# Patient Record
Sex: FEMALE | Race: WHITE | NOT HISPANIC OR LATINO | URBAN - METROPOLITAN AREA
[De-identification: names, ages, dates, MRNs, and addresses within clinical notes are randomized per-mention and may not be internally consistent; named-entity substitution may affect disease eponyms.]

---

## 2017-04-04 ENCOUNTER — OUTPATIENT (OUTPATIENT)
Dept: OUTPATIENT SERVICES | Facility: HOSPITAL | Age: 82
LOS: 1 days | Discharge: HOME | End: 2017-04-04

## 2017-06-27 DIAGNOSIS — Z79.01 LONG TERM (CURRENT) USE OF ANTICOAGULANTS: ICD-10-CM

## 2017-06-27 DIAGNOSIS — I48.91 UNSPECIFIED ATRIAL FIBRILLATION: ICD-10-CM

## 2017-11-07 ENCOUNTER — OUTPATIENT (OUTPATIENT)
Dept: OUTPATIENT SERVICES | Facility: HOSPITAL | Age: 82
LOS: 1 days | Discharge: HOME | End: 2017-11-07

## 2017-11-07 DIAGNOSIS — E03.9 HYPOTHYROIDISM, UNSPECIFIED: ICD-10-CM

## 2017-11-07 DIAGNOSIS — I25.10 ATHEROSCLEROTIC HEART DISEASE OF NATIVE CORONARY ARTERY WITHOUT ANGINA PECTORIS: ICD-10-CM

## 2017-11-07 DIAGNOSIS — E78.5 HYPERLIPIDEMIA, UNSPECIFIED: ICD-10-CM

## 2017-11-07 DIAGNOSIS — E78.4 OTHER HYPERLIPIDEMIA: ICD-10-CM

## 2017-11-07 DIAGNOSIS — Z02.9 ENCOUNTER FOR ADMINISTRATIVE EXAMINATIONS, UNSPECIFIED: ICD-10-CM

## 2017-11-07 DIAGNOSIS — E55.9 VITAMIN D DEFICIENCY, UNSPECIFIED: ICD-10-CM

## 2017-11-07 DIAGNOSIS — E11.9 TYPE 2 DIABETES MELLITUS WITHOUT COMPLICATIONS: ICD-10-CM

## 2017-11-07 DIAGNOSIS — D64.9 ANEMIA, UNSPECIFIED: ICD-10-CM

## 2017-11-07 DIAGNOSIS — E13.9 OTHER SPECIFIED DIABETES MELLITUS WITHOUT COMPLICATIONS: ICD-10-CM

## 2017-11-07 DIAGNOSIS — D64.0 HEREDITARY SIDEROBLASTIC ANEMIA: ICD-10-CM

## 2017-11-07 DIAGNOSIS — I10 ESSENTIAL (PRIMARY) HYPERTENSION: ICD-10-CM

## 2017-11-07 DIAGNOSIS — N39.0 URINARY TRACT INFECTION, SITE NOT SPECIFIED: ICD-10-CM

## 2017-11-09 ENCOUNTER — OUTPATIENT (OUTPATIENT)
Dept: OUTPATIENT SERVICES | Facility: HOSPITAL | Age: 82
LOS: 1 days | Discharge: HOME | End: 2017-11-09

## 2017-11-09 DIAGNOSIS — E78.5 HYPERLIPIDEMIA, UNSPECIFIED: ICD-10-CM

## 2017-11-09 DIAGNOSIS — D64.0 HEREDITARY SIDEROBLASTIC ANEMIA: ICD-10-CM

## 2017-11-09 DIAGNOSIS — I25.10 ATHEROSCLEROTIC HEART DISEASE OF NATIVE CORONARY ARTERY WITHOUT ANGINA PECTORIS: ICD-10-CM

## 2017-11-09 DIAGNOSIS — I10 ESSENTIAL (PRIMARY) HYPERTENSION: ICD-10-CM

## 2017-11-09 DIAGNOSIS — E11.9 TYPE 2 DIABETES MELLITUS WITHOUT COMPLICATIONS: ICD-10-CM

## 2017-11-09 DIAGNOSIS — D64.9 ANEMIA, UNSPECIFIED: ICD-10-CM

## 2017-11-09 DIAGNOSIS — E55.9 VITAMIN D DEFICIENCY, UNSPECIFIED: ICD-10-CM

## 2017-11-09 DIAGNOSIS — E03.9 HYPOTHYROIDISM, UNSPECIFIED: ICD-10-CM

## 2017-11-09 DIAGNOSIS — E13.9 OTHER SPECIFIED DIABETES MELLITUS WITHOUT COMPLICATIONS: ICD-10-CM

## 2017-11-09 DIAGNOSIS — E78.4 OTHER HYPERLIPIDEMIA: ICD-10-CM

## 2017-11-09 DIAGNOSIS — N39.0 URINARY TRACT INFECTION, SITE NOT SPECIFIED: ICD-10-CM

## 2018-10-04 ENCOUNTER — OUTPATIENT (OUTPATIENT)
Dept: OUTPATIENT SERVICES | Facility: HOSPITAL | Age: 83
LOS: 1 days | Discharge: HOME | End: 2018-10-04

## 2018-10-04 DIAGNOSIS — I25.10 ATHEROSCLEROTIC HEART DISEASE OF NATIVE CORONARY ARTERY WITHOUT ANGINA PECTORIS: ICD-10-CM

## 2018-10-04 DIAGNOSIS — R94.6 ABNORMAL RESULTS OF THYROID FUNCTION STUDIES: ICD-10-CM

## 2020-01-12 ENCOUNTER — INPATIENT (INPATIENT)
Facility: HOSPITAL | Age: 85
LOS: 9 days | End: 2020-01-22
Attending: HOSPITALIST | Admitting: HOSPITALIST
Payer: MEDICARE

## 2020-01-12 VITALS
DIASTOLIC BLOOD PRESSURE: 114 MMHG | HEART RATE: 127 BPM | OXYGEN SATURATION: 100 % | RESPIRATION RATE: 25 BRPM | WEIGHT: 154.32 LBS | SYSTOLIC BLOOD PRESSURE: 173 MMHG

## 2020-01-12 DIAGNOSIS — T85.79XA INFECTION AND INFLAMMATORY REACTION DUE TO OTHER INTERNAL PROSTHETIC DEVICES, IMPLANTS AND GRAFTS, INITIAL ENCOUNTER: Chronic | ICD-10-CM

## 2020-01-12 LAB
ANION GAP SERPL CALC-SCNC: 16 MMOL/L — HIGH (ref 7–14)
APPEARANCE UR: CLEAR — SIGNIFICANT CHANGE UP
BACTERIA # UR AUTO: NEGATIVE — SIGNIFICANT CHANGE UP
BASE EXCESS BLDV CALC-SCNC: -5.7 MMOL/L — LOW (ref -2–2)
BASE EXCESS BLDV CALC-SCNC: -6.4 MMOL/L — LOW (ref -2–2)
BASOPHILS # BLD AUTO: 0.01 K/UL — SIGNIFICANT CHANGE UP (ref 0–0.2)
BASOPHILS NFR BLD AUTO: 0.1 % — SIGNIFICANT CHANGE UP (ref 0–1)
BILIRUB UR-MCNC: NEGATIVE — SIGNIFICANT CHANGE UP
BUN SERPL-MCNC: 70 MG/DL — CRITICAL HIGH (ref 10–20)
CA-I SERPL-SCNC: 1.13 MMOL/L — SIGNIFICANT CHANGE UP (ref 1.12–1.3)
CA-I SERPL-SCNC: 1.15 MMOL/L — SIGNIFICANT CHANGE UP (ref 1.12–1.3)
CALCIUM SERPL-MCNC: 8.4 MG/DL — LOW (ref 8.5–10.1)
CHLORIDE SERPL-SCNC: 111 MMOL/L — HIGH (ref 98–110)
CO2 SERPL-SCNC: 15 MMOL/L — LOW (ref 17–32)
COLOR SPEC: YELLOW — SIGNIFICANT CHANGE UP
CREAT SERPL-MCNC: 1.6 MG/DL — HIGH (ref 0.7–1.5)
DIFF PNL FLD: ABNORMAL
EOSINOPHIL # BLD AUTO: 0 K/UL — SIGNIFICANT CHANGE UP (ref 0–0.7)
EOSINOPHIL NFR BLD AUTO: 0 % — SIGNIFICANT CHANGE UP (ref 0–8)
EPI CELLS # UR: 2 /HPF — SIGNIFICANT CHANGE UP (ref 0–5)
GAS PNL BLDV: 136 MMOL/L — SIGNIFICANT CHANGE UP (ref 136–145)
GAS PNL BLDV: 141 MMOL/L — SIGNIFICANT CHANGE UP (ref 136–145)
GAS PNL BLDV: SIGNIFICANT CHANGE UP
GAS PNL BLDV: SIGNIFICANT CHANGE UP
GLUCOSE SERPL-MCNC: 129 MG/DL — HIGH (ref 70–99)
GLUCOSE UR QL: NEGATIVE — SIGNIFICANT CHANGE UP
HCO3 BLDV-SCNC: 17 MMOL/L — LOW (ref 22–29)
HCO3 BLDV-SCNC: 19 MMOL/L — LOW (ref 22–29)
HCT VFR BLD CALC: 40.7 % — SIGNIFICANT CHANGE UP (ref 37–47)
HCT VFR BLDA CALC: 37.7 % — SIGNIFICANT CHANGE UP (ref 34–44)
HCT VFR BLDA CALC: 39.6 % — SIGNIFICANT CHANGE UP (ref 34–44)
HGB BLD CALC-MCNC: 12.3 G/DL — LOW (ref 14–18)
HGB BLD CALC-MCNC: 12.9 G/DL — LOW (ref 14–18)
HGB BLD-MCNC: 13.3 G/DL — SIGNIFICANT CHANGE UP (ref 12–16)
HYALINE CASTS # UR AUTO: 6 /LPF — SIGNIFICANT CHANGE UP (ref 0–7)
IMM GRANULOCYTES NFR BLD AUTO: 0.8 % — HIGH (ref 0.1–0.3)
KETONES UR-MCNC: NEGATIVE — SIGNIFICANT CHANGE UP
LACTATE BLDV-MCNC: 3.5 MMOL/L — HIGH (ref 0.5–1.6)
LACTATE BLDV-MCNC: 3.9 MMOL/L — HIGH (ref 0.5–1.6)
LACTATE SERPL-SCNC: 3.7 MMOL/L — HIGH (ref 0.7–2)
LEUKOCYTE ESTERASE UR-ACNC: NEGATIVE — SIGNIFICANT CHANGE UP
LIDOCAIN IGE QN: 99 U/L — HIGH (ref 7–60)
LYMPHOCYTES # BLD AUTO: 1.18 K/UL — LOW (ref 1.2–3.4)
LYMPHOCYTES # BLD AUTO: 10.1 % — LOW (ref 20.5–51.1)
MAGNESIUM SERPL-MCNC: 2.1 MG/DL — SIGNIFICANT CHANGE UP (ref 1.8–2.4)
MCHC RBC-ENTMCNC: 31.4 PG — HIGH (ref 27–31)
MCHC RBC-ENTMCNC: 32.7 G/DL — SIGNIFICANT CHANGE UP (ref 32–37)
MCV RBC AUTO: 96.2 FL — SIGNIFICANT CHANGE UP (ref 81–99)
MONOCYTES # BLD AUTO: 0.6 K/UL — SIGNIFICANT CHANGE UP (ref 0.1–0.6)
MONOCYTES NFR BLD AUTO: 5.1 % — SIGNIFICANT CHANGE UP (ref 1.7–9.3)
NEUTROPHILS # BLD AUTO: 9.78 K/UL — HIGH (ref 1.4–6.5)
NEUTROPHILS NFR BLD AUTO: 83.9 % — HIGH (ref 42.2–75.2)
NITRITE UR-MCNC: NEGATIVE — SIGNIFICANT CHANGE UP
NRBC # BLD: 2 /100 WBCS — HIGH (ref 0–0)
PCO2 BLDV: 26 MMHG — LOW (ref 41–51)
PCO2 BLDV: 35 MMHG — LOW (ref 41–51)
PH BLDV: 7.35 — SIGNIFICANT CHANGE UP (ref 7.26–7.43)
PH BLDV: 7.42 — SIGNIFICANT CHANGE UP (ref 7.26–7.43)
PH UR: 5.5 — SIGNIFICANT CHANGE UP (ref 5–8)
PLATELET # BLD AUTO: 80 K/UL — LOW (ref 130–400)
PO2 BLDV: 164 MMHG — HIGH (ref 20–40)
PO2 BLDV: 183 MMHG — HIGH (ref 20–40)
POTASSIUM BLDV-SCNC: 4.3 MMOL/L — SIGNIFICANT CHANGE UP (ref 3.3–5.6)
POTASSIUM BLDV-SCNC: 5.4 MMOL/L — SIGNIFICANT CHANGE UP (ref 3.3–5.6)
POTASSIUM SERPL-MCNC: 4.8 MMOL/L — SIGNIFICANT CHANGE UP (ref 3.5–5)
POTASSIUM SERPL-SCNC: 4.8 MMOL/L — SIGNIFICANT CHANGE UP (ref 3.5–5)
PROT UR-MCNC: ABNORMAL
RBC # BLD: 4.23 M/UL — SIGNIFICANT CHANGE UP (ref 4.2–5.4)
RBC # FLD: 17.9 % — HIGH (ref 11.5–14.5)
RBC CASTS # UR COMP ASSIST: 2 /HPF — SIGNIFICANT CHANGE UP (ref 0–4)
SAO2 % BLDV: 100 % — SIGNIFICANT CHANGE UP
SAO2 % BLDV: 100 % — SIGNIFICANT CHANGE UP
SODIUM SERPL-SCNC: 142 MMOL/L — SIGNIFICANT CHANGE UP (ref 135–146)
SP GR SPEC: 1.02 — SIGNIFICANT CHANGE UP (ref 1.01–1.02)
UROBILINOGEN FLD QL: SIGNIFICANT CHANGE UP
WBC # BLD: 11.66 K/UL — HIGH (ref 4.8–10.8)
WBC # FLD AUTO: 11.66 K/UL — HIGH (ref 4.8–10.8)
WBC UR QL: 1 /HPF — SIGNIFICANT CHANGE UP (ref 0–5)

## 2020-01-12 PROCEDURE — 99291 CRITICAL CARE FIRST HOUR: CPT | Mod: GC

## 2020-01-12 PROCEDURE — 71045 X-RAY EXAM CHEST 1 VIEW: CPT | Mod: 26

## 2020-01-12 PROCEDURE — 74177 CT ABD & PELVIS W/CONTRAST: CPT | Mod: 26

## 2020-01-12 RX ORDER — FUROSEMIDE 40 MG
1 TABLET ORAL
Qty: 0 | Refills: 0 | DISCHARGE

## 2020-01-12 RX ORDER — AZTREONAM 2 G
0 VIAL (EA) INJECTION
Qty: 0 | Refills: 0 | DISCHARGE

## 2020-01-12 RX ORDER — METOPROLOL TARTRATE 50 MG
25 TABLET ORAL DAILY
Refills: 0 | Status: DISCONTINUED | OUTPATIENT
Start: 2020-01-12 | End: 2020-01-22

## 2020-01-12 RX ORDER — CHLORHEXIDINE GLUCONATE 213 G/1000ML
1 SOLUTION TOPICAL
Refills: 0 | Status: DISCONTINUED | OUTPATIENT
Start: 2020-01-12 | End: 2020-01-22

## 2020-01-12 RX ORDER — AZTREONAM 2 G
500 VIAL (EA) INJECTION ONCE
Refills: 0 | Status: COMPLETED | OUTPATIENT
Start: 2020-01-12 | End: 2020-01-12

## 2020-01-12 RX ORDER — APIXABAN 2.5 MG/1
1 TABLET, FILM COATED ORAL
Qty: 0 | Refills: 0 | DISCHARGE

## 2020-01-12 RX ORDER — LINEZOLID 600 MG/300ML
0 INJECTION, SOLUTION INTRAVENOUS
Qty: 0 | Refills: 0 | DISCHARGE

## 2020-01-12 RX ORDER — METRONIDAZOLE 500 MG
500 TABLET ORAL ONCE
Refills: 0 | Status: COMPLETED | OUTPATIENT
Start: 2020-01-12 | End: 2020-01-12

## 2020-01-12 RX ORDER — SODIUM CHLORIDE 9 MG/ML
2500 INJECTION, SOLUTION INTRAVENOUS ONCE
Refills: 0 | Status: COMPLETED | OUTPATIENT
Start: 2020-01-12 | End: 2020-01-12

## 2020-01-12 RX ORDER — HEPARIN SODIUM 5000 [USP'U]/ML
10 INJECTION INTRAVENOUS; SUBCUTANEOUS
Qty: 25000 | Refills: 0 | Status: DISCONTINUED | OUTPATIENT
Start: 2020-01-12 | End: 2020-01-14

## 2020-01-12 RX ORDER — LINEZOLID 600 MG/300ML
600 INJECTION, SOLUTION INTRAVENOUS EVERY 12 HOURS
Refills: 0 | Status: DISCONTINUED | OUTPATIENT
Start: 2020-01-12 | End: 2020-01-13

## 2020-01-12 RX ORDER — MEROPENEM 1 G/30ML
1000 INJECTION INTRAVENOUS EVERY 12 HOURS
Refills: 0 | Status: COMPLETED | OUTPATIENT
Start: 2020-01-12 | End: 2020-01-17

## 2020-01-12 RX ORDER — METRONIDAZOLE 500 MG
1 TABLET ORAL
Qty: 0 | Refills: 0 | DISCHARGE

## 2020-01-12 RX ORDER — LINEZOLID 600 MG/300ML
600 INJECTION, SOLUTION INTRAVENOUS ONCE
Refills: 0 | Status: COMPLETED | OUTPATIENT
Start: 2020-01-12 | End: 2020-01-12

## 2020-01-12 RX ADMIN — Medication 50 MILLIGRAM(S): at 12:30

## 2020-01-12 RX ADMIN — LINEZOLID 300 MILLIGRAM(S): 600 INJECTION, SOLUTION INTRAVENOUS at 13:15

## 2020-01-12 RX ADMIN — Medication 500 MILLIGRAM(S): at 13:30

## 2020-01-12 RX ADMIN — SODIUM CHLORIDE 2500 MILLILITER(S): 9 INJECTION, SOLUTION INTRAVENOUS at 12:07

## 2020-01-12 RX ADMIN — Medication 100 MILLIGRAM(S): at 15:23

## 2020-01-12 RX ADMIN — HEPARIN SODIUM 0.1 UNIT(S)/HR: 5000 INJECTION INTRAVENOUS; SUBCUTANEOUS at 23:36

## 2020-01-12 NOTE — ED ADULT NURSE NOTE - CHIEF COMPLAINT QUOTE
Pt sent in from [facility] for respiratory distress, use of accessory muscles, and hypoxia. Pt was 75% on 3l nc, 100% on non breather.

## 2020-01-12 NOTE — ED PROVIDER NOTE - CARE PLAN
Principal Discharge DX:	Sepsis  Secondary Diagnosis:	RITU (acute kidney injury)  Secondary Diagnosis:	Fever Principal Discharge DX:	Sepsis  Secondary Diagnosis:	RITU (acute kidney injury)  Secondary Diagnosis:	Fever  Secondary Diagnosis:	Pneumonia

## 2020-01-12 NOTE — H&P ADULT - NSHPPHYSICALEXAM_GEN_ALL_CORE
GENERAL: NAD, lethargic  HEAD:  Atraumatic, Normocephalic  EYES: EOMI, PERRLA, conjunctiva and sclera clear  ENT: Moist mucous membranes  CHEST/LUNG: b/l basal crackles   HEART: normal s1 s2   ABDOMEN: Soft, Nontender, Nondistended. No hepatomegally, cholecystostomy tube in place  EXTREMITIES:  brisk capillary refill. No clubbing, cyanosis, or edema  NERVOUS SYSTEM:  Alert & Oriented X2,   SKIN: No rashes or lesions GENERAL: NAD, lethargic  HEAD:  Atraumatic, Normocephalic  EYES: EOMI, PERRLA, conjunctiva and sclera clear  ENT: Moist mucous membranes  CHEST/LUNG: b/l basal crackles   HEART: normal s1 s2   ABDOMEN: Soft, Nontender, Nondistended. No hepatomegaly, cholecystostomy tube in place  EXTREMITIES:  brisk capillary refill. No clubbing, cyanosis, or edema  NERVOUS SYSTEM:  Alert & Oriented X2,   SKIN: No rashes or lesions

## 2020-01-12 NOTE — H&P ADULT - ASSESSMENT
89 year old with pmhx of Afib on Eliquis, diverticulosis, GERD, HFrEF, chronic Smith right eye blindness, presents from NH with respiratory distress for 2 days duration. admitted for Acute hypoxic resp failure.     # Acute hypoxic respiratory failure, secondary to HCAP and possible Heart failure exacerbation.   # Sepsis   # B/I plural effusion, and right heart enlargement in CT scan is another component, crackles on P/E   # HAGMA secondary to sepsis and Uremia   # Type 2 NSTEMI, no chest pain or   - fever and hypotension. lactic acidosis   - Meropenem and vancomycin   - MRSA swab if negative d/c vancomycin   - blood culture, sputum culture   - c/w Oxygen support, currently on 2 L NC   - urine legionella.   - RVP   - LE Duplex   - 2d echo   - BNP   - ID eval    # afib on 2.5 mg Eliquis   - hold Eliquis and switch to heparin drip   - f/u PTT   - c/w metoprolol 25 mg QD     # EFrEF  - admit to telemetry  - pulmonary edema and plural effusion are another factors explain the symptoms  - hold PO lasix for now   - strict intake output  - daily weight   - c/w BB     # Cholecystitis s/p cholecystostomy tube placed last week at [facility]    # DVT PPX on heparin   # DASH Diet   # FULL CODE 89 year old with pmhx of Afib on Eliquis, diverticulosis, GERD, HFrEF, chronic Smith right eye blindness, presents from NH with respiratory distress for 2 days duration. admitted for Acute hypoxic resp failure.     # Acute hypoxic respiratory failure, secondary to HCAP and possible Heart failure exacerbation.   # Sepsis   # HAGMA secondary to sepsis and Uremia   - fever and hypotension. lactic acidosis   - s/p 2.5 Ls, aztreonam, zyvox and flagyl in ED   - add Meropenem and c/w zyvox  - MRSA swab if negative d/c vancomycin   - blood culture, sputum culture   - c/w Oxygen support, currently on 2 L NC   - urine legionella. .  - RVP   - LE Duplex   - 2d echo   - BNP   - ID eval    # afib on 2.5 mg Eliquis   - hold Eliquis and switch to heparin drip   - f/u PTT   - c/w metoprolol 25 mg QD     # likely Type 2 NSTEMI due to hypotension, no chest pain or ischemic changes in the EKG  # B/I plural effusion, and right heart enlargement in CT scan is another component, crackles on P/E   # RITU vs worsening CKD  # H/O EFrEF  - hemodynamically stable now   - monitor electrolytes and UO  - urine electrolytes  - consider diuresis    - admit to telemetry  - pulmonary edema and plural effusion are another factors explain the symptoms  - hold PO lasix for now   - strict intake output  - daily weight   - c/w BB     # Cholecystitis s/p cholecystostomy tube placed last week at [facility]    # DVT PPX on heparin   # DASH Diet   # FULL CODE 89 year old with pmhx of Afib on Eliquis, diverticulosis, GERD, HFrEF, chronic Smith right eye blindness, presents from NH with respiratory distress for 2 days duration. admitted for Acute hypoxic resp failure.     # Acute hypoxic respiratory failure, secondary to HCAP and possible Heart failure exacerbation.   # Sepsis   # HAGMA secondary to sepsis and Uremia   - fever and hypotension. lactic acidosis   - s/p 2.5 Ls, aztreonam, zyvox and flagyl in ED   - add Meropenem and c/w zyvox  - MRSA swab if negative d/c vancomycin   - blood culture, sputum culture   - c/w Oxygen support, currently on 2 L NC   - urine legionella. .  - RVP   - LE Duplex   - 2d echo   - BNP   - ID eval    # afib on 2.5 mg Eliquis   - hold Eliquis and switch to heparin drip   - f/u PTT   - c/w metoprolol 25 mg QD     # likely Type 2 NSTEMI due to hypotension, no chest pain or ischemic changes in the EKG  # B/I plural effusion, and right heart enlargement in CT scan is another component, crackles on P/E   # RITU vs worsening CKD  # H/O EFrEF  - hemodynamically stable now   - monitor electrolytes and UO  - urine electrolytes  - consider diuresis    - admit to telemetry  - pulmonary edema and plural effusion are another factors explain the symptoms  - hold PO lasix for now   - strict intake output  - daily weight   - c/w BB     # Cholecystitis s/p cholecystostomy tube placed last week at [facility]  # soft tissue density in the presacral space in CT scan, possible rectal pathology, further work up as outpt  # DVT PPX on heparin   # DASH Diet   # FULL CODE

## 2020-01-12 NOTE — ED PROVIDER NOTE - ATTENDING CONTRIBUTION TO CARE
61 y/o F pmh afib on NOAC, hx cholecystitis s/p cholecystomy drain, hx PNA, started on aztreonam, metronidazole, linezolid, first dose was supposed to be last night (given?), sent from NH for desat and resp distress. + cough. As per family pt was more functional weeks ago, but has had steady decline since illness. Pt is DNR.

## 2020-01-12 NOTE — H&P ADULT - NSHPSOURCEINFORD_GEN_ALL_CORE
Child/Patient -Oral Nystatin 4 x daily for oral candidiasis  -Fluconazole 150mg x1 for Vaginal candidiasis

## 2020-01-12 NOTE — ED PROVIDER NOTE - OBJECTIVE STATEMENT
89y/o F afib, cholecystectomy with biliary drain , nicolas is sent from nursing home for respiratory distress pt sat 76 on room air, now on nonrebreather at 76%. cough, congestion, runny nose at nh.  no vomiting or diarrhea.

## 2020-01-12 NOTE — H&P ADULT - HISTORY OF PRESENT ILLNESS
89 year old with pmhx of Afib on Eliquis, diverticulosis, GERD, chronic Smith right eye blindness, presents from NH with respiratory distress for 2 days duration.   Patient was discharged to NH from [email or web address] after being treated for septic shock secondary to PNA, her previous hospitalization was complicated with cholecystitis managed with cholecystostomy tube, and RITU.   Per family she is lethargic and poorly communicate since she admitted to NH , has been having worsening SOB for the last 2 days, SOB is associated with cough, runny nose, denies chest pain, urinary symptoms, abd pain.   per NH nurse, patient ahd low appetite, poor PO intake, was not participating in rehab. was not complaining of any other symptoms.   was discharged to NH on Aztreonam, flagyl and zyvox. the last one was never given there due to delay in delivery.   per family; at baseline she has mild dementia and she was fully independent before recent illness last week.  in ed 127/114, hr 127, 100% o2 sat on 8 Ls, temp 101.7 lactate 3.8, CXR showed b/l lower lobe opacities.   discharged to NH with a Smith catheter that was renewed in ED.   had ct abd in ED that showed Marked right heart enlargement with reflux contrast into the IVC and hepatic veins, compatible with right heart failure. Cholecystostomy tube in gallbladder fossa. and increased soft tissue density in the presacral space etiology not apparent on this exam however rectal pathology is a consideration.

## 2020-01-12 NOTE — ED ADULT NURSE NOTE - OBJECTIVE STATEMENT
pt here presents from nh d/t hypoxia. pt is verbal to painful stimuli. febrile on arrival. maintaining sats on 3lnc. freedman present from NH and changed in ED

## 2020-01-12 NOTE — ED PROVIDER NOTE - CLINICAL SUMMARY MEDICAL DECISION MAKING FREE TEXT BOX
Pt w/ desat and sepsis. O2 improved. Pt given IVF, Broad spect abx. Pt looked more comfortable, skin better perfused, more awake and active. CXR concerning for b/l infiltrate. ct abd no acute new process. elevated lactate. Pt stable for entire ed course. Will admit to med for sepsis for further iv abx, ivf, specialist consult and further w/up.

## 2020-01-12 NOTE — ED ADULT TRIAGE NOTE - CHIEF COMPLAINT QUOTE
Pt sent in from [facility] for respiratory distress, use of accessory muscles, and hypoxia. Pt was 75% on 3l nc, 100% on non breather.

## 2020-01-12 NOTE — H&P ADULT - NSHPLABSRESULTS_GEN_ALL_CORE
13.3   11. )-----------( 80       ( 2020 11:40 )             40.7           142  |  111<H>  |  70<HH>  ----------------------------<  129<H>  4.8   |  15<L>  |  1.6<H>    Ca    8.4<L>      2020 11:40  Mg     2.1                     Urinalysis Basic - ( 2020 15:50 )    Color: Yellow / Appearance: Clear / S.023 / pH: x  Gluc: x / Ketone: Negative  / Bili: Negative / Urobili: <2 mg/dL   Blood: x / Protein: 30 mg/dL / Nitrite: Negative   Leuk Esterase: Negative / RBC: 2 /HPF / WBC 1 /HPF   Sq Epi: x / Non Sq Epi: 2 /HPF / Bacteria: Negative            Lactate Trend   @ 11:40 Lactate:3.7             CAPILLARY BLOOD GLUCOSE            < from: CT Abdomen and Pelvis w/ IV Cont (20 @ 17:08) >    Right greater than left pleural effusions.    Marked right heart enlargement with reflux contrast into the IVC and hepatic veins, compatible with right heart failure.    Cholecystostomy tube in gallbladder fossa.    There is increased soft tissue density in the presacral space etiology not apparent on this exam however rectal pathology is a consideration.    < end of copied text >

## 2020-01-12 NOTE — ED PROVIDER NOTE - INTERNATIONAL TRAVEL
Ongoing SW/CM Assessment/Plan of Care Note     See SW/CM flowsheets for goals and other objective data.    Patient/Family discharge goal (s):  Goal #1: Psychosocial needs assessed  Goal #2: Home discharge arranged       PT Recommendation:  Recommendation for Discharge: PT: ([facility] vs HH pending gait independence)    OT Recommendation:  Recommendations for Discharge: OT: Assisted living, Home, Home therapy(back to [facility] with/without home therapy )    SLP Recommendation:       Disposition:       Progress note:   Attended 3rd floor outcome facilitation team rounding. See interdisciplinary team note for clinical status.     Pt is able to ambulate at baseline so she can return to her assisted living facility upon d/c.  [facility] RN, Alison (#[phone]), was updated.  She requested that [facility] contact her when pt is d/c so she can assist in approving the services.      Son Devan Matthews may be able to transport pt back to her home tomorrow.  #[phone] or #[phone].  RN Alison will update son on possible discharge within the next few days.    SW to continue to follow through discharge; patient is aware.             No

## 2020-01-12 NOTE — H&P ADULT - ATTENDING COMMENTS
I have seen and evaluated and examined the patient today. I agree with the resident's documentation set forth.  Agree w empiric ABX to treat sepsis & checking echo to assess cardiac status.

## 2020-01-12 NOTE — ED PROVIDER NOTE - CRITICAL CARE PROVIDED
documentation/direct patient care (not related to procedure)/consult w/ pt's family directly relating to pts condition/interpretation of diagnostic studies/conducted a detailed discussion of DNR status

## 2020-01-12 NOTE — ED PROVIDER NOTE - PHYSICAL EXAMINATION
CONSTITUTIONAL: Ill appearing  SKIN: Warm dry  HEAD: NCAT  EYES: blindness (?)  ENT: dry MM  NECK: Supple; non tender.  CARD: RRR  RESP: CTAB  ABD: Soft non ttp; + biliary drain draining brown fluid  EXT: non tender  NEURO: moves all extrem  PSYCH: Cooperative

## 2020-01-12 NOTE — ED ADULT NURSE REASSESSMENT NOTE - NS ED NURSE REASSESS COMMENT FT1
pt alert and oriented x 2-3 with family at bedside - VSS - no c/o pain or distress at this time.  Smith in place and intact Right sided hepatic biliary drain in place and draining with no s/s of discomfort. Pt on 3 L nc.   Report given to Sharita ATKINSON in Zone 4. - bed 14a

## 2020-01-13 LAB
ALBUMIN SERPL ELPH-MCNC: 2.7 G/DL — LOW (ref 3.5–5.2)
ALBUMIN SERPL ELPH-MCNC: 2.9 G/DL — LOW (ref 3.5–5.2)
ALP SERPL-CCNC: 56 U/L — SIGNIFICANT CHANGE UP (ref 30–115)
ALP SERPL-CCNC: 57 U/L — SIGNIFICANT CHANGE UP (ref 30–115)
ALT FLD-CCNC: 137 U/L — HIGH (ref 0–41)
ALT FLD-CCNC: 146 U/L — HIGH (ref 0–41)
ANION GAP SERPL CALC-SCNC: 14 MMOL/L — SIGNIFICANT CHANGE UP (ref 7–14)
ANION GAP SERPL CALC-SCNC: 16 MMOL/L — HIGH (ref 7–14)
APTT BLD: 34 SEC — SIGNIFICANT CHANGE UP (ref 27–39.2)
APTT BLD: 35.2 SEC — SIGNIFICANT CHANGE UP (ref 27–39.2)
AST SERPL-CCNC: 43 U/L — HIGH (ref 0–41)
AST SERPL-CCNC: 49 U/L — HIGH (ref 0–41)
BILIRUB DIRECT SERPL-MCNC: 0.7 MG/DL — HIGH (ref 0–0.2)
BILIRUB INDIRECT FLD-MCNC: 0.6 MG/DL — SIGNIFICANT CHANGE UP (ref 0.2–1.2)
BILIRUB SERPL-MCNC: 1.3 MG/DL — HIGH (ref 0.2–1.2)
BUN SERPL-MCNC: 64 MG/DL — CRITICAL HIGH (ref 10–20)
BUN SERPL-MCNC: 65 MG/DL — CRITICAL HIGH (ref 10–20)
CALCIUM SERPL-MCNC: 8.2 MG/DL — LOW (ref 8.5–10.1)
CALCIUM SERPL-MCNC: 8.3 MG/DL — LOW (ref 8.5–10.1)
CHLORIDE SERPL-SCNC: 112 MMOL/L — HIGH (ref 98–110)
CHLORIDE SERPL-SCNC: 113 MMOL/L — HIGH (ref 98–110)
CK MB CFR SERPL CALC: 4.6 NG/ML — SIGNIFICANT CHANGE UP (ref 0.6–6.3)
CO2 SERPL-SCNC: 14 MMOL/L — LOW (ref 17–32)
CO2 SERPL-SCNC: 17 MMOL/L — SIGNIFICANT CHANGE UP (ref 17–32)
CREAT SERPL-MCNC: 1.3 MG/DL — SIGNIFICANT CHANGE UP (ref 0.7–1.5)
CREAT SERPL-MCNC: 1.3 MG/DL — SIGNIFICANT CHANGE UP (ref 0.7–1.5)
CULTURE RESULTS: NO GROWTH — SIGNIFICANT CHANGE UP
GLUCOSE SERPL-MCNC: 121 MG/DL — HIGH (ref 70–99)
GLUCOSE SERPL-MCNC: 127 MG/DL — HIGH (ref 70–99)
HCT VFR BLD CALC: 41 % — SIGNIFICANT CHANGE UP (ref 37–47)
HGB BLD-MCNC: 13.4 G/DL — SIGNIFICANT CHANGE UP (ref 12–16)
INR BLD: 2.43 RATIO — HIGH (ref 0.65–1.3)
LACTATE SERPL-SCNC: 2.5 MMOL/L — HIGH (ref 0.7–2)
LACTATE SERPL-SCNC: 2.8 MMOL/L — HIGH (ref 0.7–2)
MAGNESIUM SERPL-MCNC: 2.2 MG/DL — SIGNIFICANT CHANGE UP (ref 1.8–2.4)
MCHC RBC-ENTMCNC: 31.8 PG — HIGH (ref 27–31)
MCHC RBC-ENTMCNC: 32.7 G/DL — SIGNIFICANT CHANGE UP (ref 32–37)
MCV RBC AUTO: 97.4 FL — SIGNIFICANT CHANGE UP (ref 81–99)
MRSA PCR RESULT.: NEGATIVE — SIGNIFICANT CHANGE UP
NRBC # BLD: 2 /100 WBCS — HIGH (ref 0–0)
NT-PROBNP SERPL-SCNC: HIGH PG/ML (ref 0–300)
OSMOLALITY UR: 699 MOS/KG — SIGNIFICANT CHANGE UP (ref 50–1400)
PLATELET # BLD AUTO: 63 K/UL — LOW (ref 130–400)
POTASSIUM SERPL-MCNC: 4.2 MMOL/L — SIGNIFICANT CHANGE UP (ref 3.5–5)
POTASSIUM SERPL-MCNC: 4.7 MMOL/L — SIGNIFICANT CHANGE UP (ref 3.5–5)
POTASSIUM SERPL-SCNC: 4.2 MMOL/L — SIGNIFICANT CHANGE UP (ref 3.5–5)
POTASSIUM SERPL-SCNC: 4.7 MMOL/L — SIGNIFICANT CHANGE UP (ref 3.5–5)
PROT SERPL-MCNC: 4.9 G/DL — LOW (ref 6–8)
PROT SERPL-MCNC: 5 G/DL — LOW (ref 6–8)
PROTHROM AB SERPL-ACNC: 27.7 SEC — HIGH (ref 9.95–12.87)
RBC # BLD: 4.21 M/UL — SIGNIFICANT CHANGE UP (ref 4.2–5.4)
RBC # FLD: 17.9 % — HIGH (ref 11.5–14.5)
SODIUM SERPL-SCNC: 143 MMOL/L — SIGNIFICANT CHANGE UP (ref 135–146)
SODIUM SERPL-SCNC: 143 MMOL/L — SIGNIFICANT CHANGE UP (ref 135–146)
SODIUM UR-SCNC: 20 MMOL/L — SIGNIFICANT CHANGE UP
SPECIMEN SOURCE: SIGNIFICANT CHANGE UP
TROPONIN T SERPL-MCNC: 0.06 NG/ML — CRITICAL HIGH
WBC # BLD: 13.83 K/UL — HIGH (ref 4.8–10.8)
WBC # FLD AUTO: 13.83 K/UL — HIGH (ref 4.8–10.8)

## 2020-01-13 PROCEDURE — 99223 1ST HOSP IP/OBS HIGH 75: CPT

## 2020-01-13 RX ADMIN — LINEZOLID 300 MILLIGRAM(S): 600 INJECTION, SOLUTION INTRAVENOUS at 01:32

## 2020-01-13 RX ADMIN — HEPARIN SODIUM 0.1 UNIT(S)/HR: 5000 INJECTION INTRAVENOUS; SUBCUTANEOUS at 05:45

## 2020-01-13 RX ADMIN — MEROPENEM 100 MILLIGRAM(S): 1 INJECTION INTRAVENOUS at 01:31

## 2020-01-13 RX ADMIN — Medication 25 MILLIGRAM(S): at 05:45

## 2020-01-13 RX ADMIN — MEROPENEM 100 MILLIGRAM(S): 1 INJECTION INTRAVENOUS at 13:00

## 2020-01-13 RX ADMIN — LINEZOLID 300 MILLIGRAM(S): 600 INJECTION, SOLUTION INTRAVENOUS at 16:08

## 2020-01-13 NOTE — PROGRESS NOTE ADULT - ASSESSMENT
89 year old with pmhx of Afib on Eliquis, diverticulosis, GERD, HFrEF, chronic Smith right eye blindness, presents from NH with respiratory distress for 2 days duration. admitted for Acute hypoxic resp failure.     # Acute hypoxic respiratory failure, secondary to HCAP and  Heart failure exacerbation elevated BNP   # Sepsis   # HAGMA secondary to sepsis and Uremia - anion GAP : 14  - afeb this am , with adequate vitals , lactic acid decreasing    - c/w aztreonam, zyvox  - MRSA negative   - blood culture, sputum culture   - c/w Oxygen support, currently on 2 L NC   - urine legionella. .  - RVP   - LE Duplex   - 2d echo   - ID eval  - re-order labs today     # afib on 2.5 mg Eliquis   - hold Eliquis and switch to heparin drip   - f/u PTT   - c/w metoprolol 25 mg QD     # likely Type 2 NSTEMI due to hypotension, no chest pain or ischemic changes in the EKG  # B/I plural effusion, and right heart enlargement in CT scan is another component, crackles on P/E   # RITU vs worsening CKD  # H/O EFrEF  - hemodynamically stable now   - monitor electrolytes and UO  - urine electrolytes  - consider diuresis    - admit to telemetry  - pulmonary edema and plural effusion are another factors explain the symptoms  - hold PO lasix for now   - strict intake output  - daily weight   - c/w BB     # Cholecystitis s/p cholecystostomy tube placed last week at [facility]  # soft tissue density in the presacral space in CT scan, possible rectal pathology, further work up as outpt  # DVT PPX on heparin   # DASH Diet   # FULL CODE     d/w dr. GARCÍA 89 year old with pmhx of Afib on Eliquis, diverticulosis, GERD, HFrEF, chronic Smith right eye blindness, presents from NH with respiratory distress for 2 days duration. admitted for Acute hypoxic resp failure.     # Acute hypoxic respiratory failure, secondary to GRAM NEG. RDS PNA    and  Heart failure exacerbation elevated BNP   # Sepsis   # HAGMA secondary to sepsis and Uremia - anion GAP : 14  - afeb this am , with adequate vitals , lactic acid decreasing    - c/w aztreonam, zyvox  - MRSA negative   - blood culture, sputum culture   - c/w Oxygen support, currently on 2 L NC   - urine legionella. .  - RVP   - LE Duplex   - 2d echo   - ID eval  - re-order labs today     # afib on 2.5 mg Eliquis   - hold Eliquis and switch to heparin drip   - f/u PTT   - c/w metoprolol 25 mg QD     # likely Type 2 NSTEMI due to hypotension, no chest pain or ischemic changes in the EKG  # B/I plural effusion, and right heart enlargement in CT scan is another component, crackles on P/E   # RITU vs worsening CKD  # H/O EFrEF  - hemodynamically stable now   - monitor electrolytes and UO  - urine electrolytes  - consider diuresis    - admit to telemetry  - pulmonary edema and plural effusion are another factors explain the symptoms  - hold PO lasix for now   - strict intake output  - daily weight   - c/w BB     # Cholecystitis s/p cholecystostomy tube placed last week at [facility]  # soft tissue density in the presacral space in CT scan, possible rectal pathology, further work up as outpt  # DVT PPX on heparin   # DASH Diet   # FULL CODE     d/w dr. GARCÍA

## 2020-01-13 NOTE — CONSULT NOTE ADULT - ASSESSMENT
ASSESSMENT  89yFemale with a past medical history of paroxysmal afib on Eliquis, chronic diastolic CHF, mitral regurgitation, diverticulosis, chronic Smith, right eye blindness who presents from [facility] via EMS with respiratory distress x2 days. Patient admitted for acute hypoxic respiratory failure, with sepsis on admission.    IMPRESSION  #Sepsis on admission (2 or more of T<96.8F, T>101F, Pulse>90, Resp Rate>20, WBC>12, wbc<4, Bands>10%), or Severe if (+) lactic acidosis, metabolic encephalopathy, RITU   -CXR demonstrates bilateral basilar lung opacities.  -Sepsis likely 2/2 hospital acquired pneumonia, given recent [facility] admission for sepsis 2/2 pneumonia.   -Other considerations include Meningitis (given +nuchal rigidity and +photophobia, although negative Kernig and Brudzinski sign) vs Cholecystectomy drain infection (although no abdominal tenderness) vs UTI (UA +blood, although negative for nitrite and leukocyte esterase).  -Current vitals: T 98.7, HR 85, /57, RR 20, SpO2 100% on 2L NC.  -Lactic acidosis improving (3.7 --> 2.5). Anion gap improving (16 --> 14).  -MRSA swab negative.    RECOMMENDATIONS  -F/u pending blood and sputum cultures. However, note that patient was on antibiotics at nursing home prior to admission (Aztreonam and Flagyl, also possibly Zyvox).  -F/u labs: WBC, lactate, RVP.  -F/u TTE.  -Continue Meropenem and Linezolid.  -Consider r/o meningitis with CT head and lumbar puncture.      This is a pended note. All final recommendations to follow pending discussion with ID Attending. ASSESSMENT  89yFemale with a past medical history of paroxysmal afib on Eliquis, chronic diastolic CHF, mitral regurgitation, diverticulosis, chronic Smith, right eye blindness who presents from [facility] via EMS with respiratory distress x2 days. Patient admitted for acute hypoxic respiratory failure, with sepsis on admission.    IMPRESSION  #Sepsis on admission (2 or more of T<96.8F, T>101F, Pulse>90, Resp Rate>20, WBC>12, wbc<4, Bands>10%), or Severe if (+) lactic acidosis, metabolic encephalopathy, RITU   -CXR demonstrates bilateral basilar lung opacities.  -Sepsis likely 2/2 hospital acquired pneumonia, given recent [facility] admission for sepsis 2/2 pneumonia.   -Other considerations include:      --Meningitis (given +nuchal rigidity and +photophobia, although negative Kernig and Brudzinski sign) vs      --Cholecystectomy drain infection (although no abdominal tenderness) vs      --UTI (UA +moderate blood, possibly due to traumatic Smith catheterization in the ED, although negative for nitrite and leukocyte esterase).  -Current vitals: T 98.7, HR 85, /57, RR 20, SpO2 100% on 2L NC.  -Lactic acidosis improving (3.7 --> 2.5). Anion gap improving (16 --> 14).  -MRSA swab negative.    RECOMMENDATIONS  -F/u pending blood, urine, and sputum cultures. However, note that patient was on antibiotics at nursing home prior to admission (Aztreonam and Flagyl, also possibly Zyvox).  -F/u labs: WBC, lactate, RVP, urine Legionella.   -F/u TTE.  -Continue Meropenem and Linezolid.  -Consider r/o meningitis with CT head and lumbar puncture.      This is a pended note. All final recommendations to follow pending discussion with ID Attending. ASSESSMENT  89yFemale with a past medical history of paroxysmal afib on Eliquis, chronic diastolic CHF, mitral regurgitation, diverticulosis, chronic Smith, right eye blindness who presents from [facility] via EMS with respiratory distress x2 days. Patient admitted for acute hypoxic respiratory failure, with sepsis on admission.    IMPRESSION  #Sepsis on admission (2 or more of T<96.8F, T>101F, Pulse>90, Resp Rate>20, WBC>12, wbc<4, Bands>10%), or Severe if (+) lactic acidosis, metabolic encephalopathy, RITU   -CXR demonstrates bilateral basilar lung opacities.  -WBC increased from 11.66 --> 13.83.  -Sepsis likely 2/2 hospital acquired pneumonia, given recent [facility] admission for sepsis 2/2 pneumonia.   -Other considerations include:      --Meningitis (given +nuchal rigidity and +photophobia, although negative Kernig and Brudzinski sign) vs      --Cholecystectomy drain infection (although no abdominal tenderness) vs      --UTI (UA +moderate blood, possibly due to traumatic Smith catheterization in the ED, although negative for nitrite and leukocyte esterase).  -Current vitals: T 98.7, HR 85, /57, RR 20, SpO2 100% on 2L NC.  -Lactic acidosis improving (3.7 --> 2.5 --> 2.8). Anion gap unchanged (16 --> 14 -->16).  -MRSA swab negative.    RECOMMENDATIONS  -F/u pending blood, urine, and sputum cultures. However, note that patient was on antibiotics at nursing home prior to admission (Aztreonam and Flagyl, also possibly Zyvox).  -F/u labs: WBC, lactate, RVP, urine Legionella.   -F/u TTE.  -Continue Meropenem and Linezolid.  -Consider r/o meningitis with CT head and lumbar puncture.      This is a pended note. All final recommendations to follow pending discussion with ID Attending. ASSESSMENT  89yFemale with a past medical history of paroxysmal afib on Eliquis, chronic diastolic CHF, mitral regurgitation, diverticulosis, chronic Smith, right eye blindness who presents from [facility] via EMS with respiratory distress x2 days. Patient admitted for acute hypoxic respiratory failure, with sepsis on admission.    IMPRESSION  #Sepsis on admission (2 or more of T<96.8F, T>101F, Pulse>90, Resp Rate>20, WBC>12, wbc<4, Bands>10%), or Severe if (+) lactic acidosis, metabolic encephalopathy, RITU   -CXR demonstrates bilateral basilar lung opacities.  -WBC increased from 11.66 --> 13.83.  -Sepsis likely 2/2 hospital acquired pneumonia, given recent [facility] admission for sepsis 2/2 pneumonia.   -Other considerations include:      --Cholecystectomy drain infection (although no abdominal tenderness) vs      --Meningitis (given mild photophobia, although negative Kernig and Brudzinski sign) vs      --UTI (UA +moderate blood, possibly due to traumatic Smith catheterization in the ED, although negative for nitrite and leukocyte esterase and WBC).  -Current vitals: T 98.7, HR 85, /57, RR 20, SpO2 100% on 2L NC.  -Lactic acidosis improving (3.7 --> 2.5 --> 2.8). Anion gap unchanged (16 --> 14 -->16).  -MRSA swab negative.    RECOMMENDATIONS  -Obtain flu swab.  -Continue Meropenem. Stop Linezolid, given negative MRSA swab.  -F/u pending blood, urine, and sputum cultures. However, note that patient was on antibiotics at nursing home prior to admission (Aztreonam and Flagyl, also possibly Zyvox).  -F/u labs: WBC, lactate, RVP, urine Legionella.   -F/u TTE.    This is a pended note. All final recommendations to follow pending discussion with ID Attending. ASSESSMENT  89yFemale with a past medical history of paroxysmal afib on Eliquis, chronic diastolic CHF, mitral regurgitation, diverticulosis, chronic Smith, right eye blindness who presents from [facility] via EMS with respiratory distress x2 days. Patient admitted for acute hypoxic respiratory failure, with sepsis on admission.    IMPRESSION  #Severe sepsis on admission (T>101F, Pulse>90,  (+) lactic acidosis, metabolic encephalopathy, RITU   -CXR demonstrates bilateral basilar lung opacities.  -WBC increased from 11.66 --> 13.83.  -Sepsis likely 2/2 hospital acquired pneumonia, given recent [facility] admission for sepsis 2/2 pneumonia.   -Other considerations include:      --Cholecystectomy drain infection (although no abdominal tenderness) vs      --Meningitis (given mild photophobia, although negative Kernig and Brudzinski sign) vs      --UTI (UA +moderate blood, possibly due to traumatic Smith catheterization in the ED, although negative for nitrite and leukocyte esterase and WBC).  -Current vitals: T 98.7, HR 85, /57, RR 20, SpO2 100% on 2L NC.  -Lactic acidosis improving (3.7 --> 2.5 --> 2.8). Anion gap unchanged (16 --> 14 -->16).  -MRSA swab negative.    RECOMMENDATIONS  -Obtain micro records from [facility]  -Continue Meropenem  -D/C Linezolid, given negative MRSA swab.  -F/u pending blood, urine, and sputum cultures. However, note that patient was on antibiotics at nursing home prior to admission (Aztreonam and Flagyl, also possibly Zyvox).  -F/u labs: WBC, lactate, RVP, urine Legionella.   -F/u TTE.

## 2020-01-13 NOTE — CONSULT NOTE ADULT - SUBJECTIVE AND OBJECTIVE BOX
SADE HARRIS  89y, Female  Allergy: penicillins (Other (U))  sulfonamides (Other (U))      CHIEF COMPLAINT: respiratory distress (2020 10:20)      HPI:  89yFemale with a past medical history of paroxysmal afib on Eliquis, chronic diastolic CHF, mitral regurgitation, diverticulosis, chronic Smith, right eye blindness who presents from [facility] via EMS with respiratory distress x2 days. Per ED, patient arrived SOB with accessory muscle use, saturating at 75% on 3L NC, and 100% on non-rebreather. Currently, patient is AAO x0, arousable but confused, denies any pain or SOB, but states she feels "miserable." Boyfriend is at bedside. He states patient has had mild dementia over the past year, however she lives alone and is normally independent at baseline, with some help from her son. He reports she has been hospitalized at [facility] 3 times over the past few months. Most recently, she was admitted to [facility] for septic shock secondary to pneumonia, complicated by RITU and cholecystectomy s/p drainage tube. She was discharged to [facility] on Aztreonam, Flagyl, Zyvox (however Zyvox was never given). At the nursing home, she had decreased appetite, + cough, and began to have vivid hallucinations of people breaking into her home. She was brought into the ED for respiratory distress and was found to be febrile and hypoxic. Boyfriend is unsure about timeline of events, including when patient was last hospitalized and when she was discharged to the nursing home.    Infectious Diseases History:  Old Micro:  Cholecystectomy drain infection    FAMILY HISTORY:  None.    PAST MEDICAL & SURGICAL HISTORY:  Paroxysmal afib on Eliquis  Diastolic CHF  Mitral regurgitation  Chronic Smith  Diverticulosis  GERD  Cholecystostomy drain infection  Hysterectomy  Tonsillectomy  Cataract surgery    SOCIAL HISTORY    Substance Use ( X ) never used  (  ) IVDU (  ) Other:  Tobacco Usage:  (   ) never smoked   ( X  ) former smoker   (   ) current smoker   Alcohol Usage: ( X  ) social  (   ) daily use (   ) denies  Sexual History: Boyfriend, at bedside.      ROS  Unable to obtain. Patient AAO x0. She denies any pain or SOB.    VITALS:  T(F): 96.4, Max: 101.7 (20 @ 11:55)  HR: 88  BP: 106/72  RR: 18Vital Signs Last 24 Hrs  T(C): 35.8 (2020 08:39), Max: 38.7 (2020 11:55)  T(F): 96.4 (2020 08:39), Max: 101.7 (2020 11:55)  HR: 88 (2020 08:39) (80 - 127)  BP: 106/72 (2020 08:39) (91/65 - 173/114)  BP(mean): --  RR: 18 (2020 08:39) (17 - 28)  SpO2: 99% (2020 08:39) (99% - 100%)    PHYSICAL EXAM:  Gen: Lethargic, lying in bed, NC in place.  HEENT: Normocephalic, atraumatic  Neck: +Nuchal rigidity. No lymphadenopathy  CV: Slightly tachycardic. Normal S1 and S2. No murmur appreciated.  Lungs: Bilateral crackles at bases, worse on the right.  Abdomen: Soft, nontender, BS present. Dressing over RUQ cholecystectomy drain.  : Smith catheter in position.  Ext:  Bilateral pitting lower extremity edema. Warm, well perfused.  Neuro: Lethargic. Photophobic. Negative Kernig and Brudzinski sign.  Skin: No rash, no lesions.    TESTS & MEASUREMENTS:                        13.3   11.66 )-----------( 80       ( 2020 11:40 )             40.7         143  |  112<H>  |  65<HH>  ----------------------------<  127<H>  4.2   |  17  |  1.3    Ca    8.2<L>      2020 00:37  Mg     2.1         TPro  4.9<L>  /  Alb  2.7<L>  /  TBili  1.3<H>  /  DBili  0.7<H>  /  AST  43<H>  /  ALT  146<H>  /  AlkPhos  56      eGFR if Non African American: 36 mL/min/1.73M2 (20 @ 00:37)  eGFR if : 42 mL/min/1.73M2 (20 @ 00:37)  eGFR if Non African American: 28 mL/min/1.73M2 (20 @ 11:40)  eGFR if : 33 mL/min/1.73M2 (20 @ 11:40)    LIVER FUNCTIONS - ( 2020 00:37 )  Alb: 2.7 g/dL / Pro: 4.9 g/dL / ALK PHOS: 56 U/L / ALT: 146 U/L / AST: 43 U/L / GGT: x           Urinalysis Basic - ( 2020 15:50 )  Color: Yellow / Appearance: Clear / S.023 / pH: x  Gluc: x / Ketone: Negative  / Bili: Negative / Urobili: <2 mg/dL   Blood: x / Protein: 30 mg/dL / Nitrite: Negative   Leuk Esterase: Negative / RBC: 2 /HPF / WBC 1 /HPF   Sq Epi: x / Non Sq Epi: 2 /HPF / Bacteria: Negative      Lactate, Blood: 2.5 mmol/L (20 @ 00:37)  Blood Gas Venous - Lactate: 3.9 mmoL/L (20 @ 13:17)  Lactate, Blood: 3.7 mmol/L (20 @ 11:40)  Blood Gas Venous - Lactate: 3.5 mmoL/L (20 @ 15:20)      INFECTIOUS DISEASES TESTING  MRSA PCR Result.: Negative (20 @ 00:15)      RADIOLOGY & ADDITIONAL TESTS:    I have personally reviewed the last Chest xray    EXAM:  XR CHEST PORTABLE IMMED 1V        PROCEDURE DATE:  2020    INTERPRETATION:  Clinical History / Reason for exam: Sepsis    Impression:    Bilateral lung opacities, predominantly at the lung bases    CROW ASHRAF M.D., ATTENDING RADIOLOGIST  This document has been electronically signed. 2020  4:31PM          CT Abdomen and Pelvis w/ IV Cont:   EXAM:  CT ABDOMEN AND PELVIS IC        PROCEDURE DATE:  2020    INTERPRETATION:  CLINICAL STATEMENT: Sepsis.    TECHNIQUE: Contiguous axial CT images were obtained from the lower chest to the pubic symphysis following administration of 100cc Optiray 320 intravenous contrast.  Oral contrast was not administered.  Reformatted images in the coronal and sagittal planes were acquired.  COMPARISON: 2012    FINDINGS:  LOWER CHEST: Partially imaged right greater than leftpleural effusions with adjacent atelectasis. Marked right heart enlargement with reflux of contrast into the IVC and hepatic veins.  HEPATOBILIARY: Cholecystostomy tube seen in the gallbladder fossa, possibly within a collapsed gallbladder.  SPLEEN: Unremarkable.  PANCREAS: Unremarkable.  ADRENAL GLANDS: Unremarkable.  KIDNEYS: No hydronephrosis. 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  PELVIC ORGANS: Urinary bladdercatheter collapsed around a Smith catheter. There is increased soft tissue density in the presacral space etiology not apparent on this exam however rectal pathology is a consideration.  PERITONEUM/MESENTERY/BOWEL: No evidence for bowel obstruction, ascites, or pneumoperitoneum.   BONES/SOFT TISSUES: Presacral edema. Anasarca. Osteopenia with degenerative changes of the spine.  OTHER: Diffuse vascular calcifications.    IMPRESSION:   Right greater than left pleural effusions.  Marked right heart enlargement with reflux contrast into the IVC and hepatic veins, compatible with right heart failure.  Cholecystostomy tube in gallbladder fossa.  There is increased soft tissue density in the presacral space etiology not apparent on this exam however rectal pathology is a consideration.    MIRIAM CARABALLO M.D., RESIDENT RADIOLOGIST  This document has been electronically signed.  AMADOR HAMILTON M.D., ATTENDING RADIOLOGIST  This document has been electronically signed. 2020  6:06PM  (20 @ 17:08)      CARDIOLOGY TESTING      All available historical records have been reviewed    MEDICATIONS  chlorhexidine 4% Liquid 1  heparin  Infusion 10  linezolid  IVPB 600  meropenem  IVPB 1000  metoprolol succinate ER 25      ANTIBIOTICS:  linezolid  IVPB 600 milliGRAM(s) IV Intermittent every 12 hours  meropenem  IVPB 1000 milliGRAM(s) IV Intermittent every 12 hours      All available historical data has been reviewed SADE HARRIS  89y, Female  Allergy: penicillins (Other (U))  sulfonamides (Other (U))      CHIEF COMPLAINT: respiratory distress (2020 10:20)      HPI:  89yFemale with a past medical history of paroxysmal afib on Eliquis, chronic diastolic CHF, mitral regurgitation, diverticulosis, chronic Smith, right eye blindness who presents from [facility] via EMS with respiratory distress x2 days. Per ED, patient arrived SOB with accessory muscle use, saturating at 75% on 3L NC, and 100% on non-rebreather. Currently, patient is AAO x0, arousable but confused, denies any pain or SOB, but states she feels "miserable." Boyfriend is at bedside. He states patient has had mild dementia over the past year, however she lives alone and is normally independent at baseline, with some help from her son. He reports she has been hospitalized at [facility] 3 times over the past few months. Most recently, she was admitted to [facility] for septic shock secondary to pneumonia, complicated by RITU and cholecystectomy s/p drainage tube. She was discharged to [facility] on Aztreonam, Flagyl, Zyvox (however Zyvox was never given). At the nursing home, she had decreased appetite, + cough, and began to have vivid hallucinations of people breaking into her home. She was brought into the ED for respiratory distress and was found to be febrile and hypoxic. Boyfriend is unsure about timeline of events, including when patient was last hospitalized and when she was discharged to the nursing home.    Infectious Diseases History:  Old Micro:  Cholecystectomy drain infection    FAMILY HISTORY:  None.    PAST MEDICAL & SURGICAL HISTORY:  Paroxysmal afib on Eliquis  Diastolic CHF  Mitral regurgitation  Chronic Smith  Diverticulosis  GERD  Cholecystostomy drain infection  Hysterectomy  Tonsillectomy  Cataract surgery    SOCIAL HISTORY    Substance Use ( X ) never used  (  ) IVDU (  ) Other:  Tobacco Usage:  (   ) never smoked   ( X  ) former smoker   (   ) current smoker   Alcohol Usage: ( X  ) social  (   ) daily use (   ) denies  Sexual History: Boyfriend, at bedside.      ROS  Unable to obtain. Patient AAO x0. She denies any pain or SOB.    VITALS:  T(F): 96.4, Max: 101.7 (20 @ 11:55)  HR: 88  BP: 106/72  RR: 18Vital Signs Last 24 Hrs  T(C): 35.8 (2020 08:39), Max: 38.7 (2020 11:55)  T(F): 96.4 (2020 08:39), Max: 101.7 (2020 11:55)  HR: 88 (2020 08:39) (80 - 127)  BP: 106/72 (2020 08:39) (91/65 - 173/114)  BP(mean): --  RR: 18 (2020 08:39) (17 - 28)  SpO2: 99% (2020 08:39) (99% - 100%)    PHYSICAL EXAM:  Gen: Lethargic, lying in bed, NC in place.  HEENT: Normocephalic, atraumatic  Neck: +Nuchal rigidity. No lymphadenopathy  CV: Slightly tachycardic. Normal S1 and S2. No murmur appreciated.  Lungs: Bilateral crackles at bases, worse on the right.  Abdomen: Soft, nontender, BS present. Dressing over RUQ cholecystectomy drain.  : Smith catheter in position, +hematuria.   Ext:  Bilateral pitting lower extremity edema. Warm, well perfused.  Neuro: Lethargic. Photophobic. Negative Kernig and Brudzinski sign.  Skin: No rash, no lesions.    TESTS & MEASUREMENTS:                        13.3   11.66 )-----------( 80       ( 2020 11:40 )             40.7         143  |  112<H>  |  65<HH>  ----------------------------<  127<H>  4.2   |  17  |  1.3    Ca    8.2<L>      2020 00:37  Mg     2.1         TPro  4.9<L>  /  Alb  2.7<L>  /  TBili  1.3<H>  /  DBili  0.7<H>  /  AST  43<H>  /  ALT  146<H>  /  AlkPhos  56      eGFR if Non African American: 36 mL/min/1.73M2 (20 @ 00:37)  eGFR if : 42 mL/min/1.73M2 (20 @ 00:37)  eGFR if Non African American: 28 mL/min/1.73M2 (20 @ 11:40)  eGFR if : 33 mL/min/1.73M2 (20 @ 11:40)    LIVER FUNCTIONS - ( 2020 00:37 )  Alb: 2.7 g/dL / Pro: 4.9 g/dL / ALK PHOS: 56 U/L / ALT: 146 U/L / AST: 43 U/L / GGT: x           Urinalysis Basic - ( 2020 15:50 )  Color: Yellow / Appearance: Clear / S.023 / pH: x  Gluc: x / Ketone: Negative  / Bili: Negative / Urobili: <2 mg/dL   Blood: x / Protein: 30 mg/dL / Nitrite: Negative   Leuk Esterase: Negative / RBC: 2 /HPF / WBC 1 /HPF   Sq Epi: x / Non Sq Epi: 2 /HPF / Bacteria: Negative      Lactate, Blood: 2.5 mmol/L (20 @ 00:37)  Blood Gas Venous - Lactate: 3.9 mmoL/L (20 @ 13:17)  Lactate, Blood: 3.7 mmol/L (20 @ 11:40)  Blood Gas Venous - Lactate: 3.5 mmoL/L (20 @ 15:20)      INFECTIOUS DISEASES TESTING  MRSA PCR Result.: Negative (20 @ 00:15)      RADIOLOGY & ADDITIONAL TESTS:    I have personally reviewed the last Chest xray    EXAM:  XR CHEST PORTABLE IMMED 1V        PROCEDURE DATE:  2020    INTERPRETATION:  Clinical History / Reason for exam: Sepsis    Impression:    Bilateral lung opacities, predominantly at the lung bases    CROW ASHRAF M.D., ATTENDING RADIOLOGIST  This document has been electronically signed. 2020  4:31PM          CT Abdomen and Pelvis w/ IV Cont:   EXAM:  CT ABDOMEN AND PELVIS IC        PROCEDURE DATE:  2020    INTERPRETATION:  CLINICAL STATEMENT: Sepsis.    TECHNIQUE: Contiguous axial CT images were obtained from the lower chest to the pubic symphysis following administration of 100cc Optiray 320 intravenous contrast.  Oral contrast was not administered.  Reformatted images in the coronal and sagittal planes were acquired.  COMPARISON: 2012    FINDINGS:  LOWER CHEST: Partially imaged right greater than leftpleural effusions with adjacent atelectasis. Marked right heart enlargement with reflux of contrast into the IVC and hepatic veins.  HEPATOBILIARY: Cholecystostomy tube seen in the gallbladder fossa, possibly within a collapsed gallbladder.  SPLEEN: Unremarkable.  PANCREAS: Unremarkable.  ADRENAL GLANDS: Unremarkable.  KIDNEYS: No hydronephrosis. 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  PELVIC ORGANS: Urinary bladdercatheter collapsed around a Smith catheter. There is increased soft tissue density in the presacral space etiology not apparent on this exam however rectal pathology is a consideration.  PERITONEUM/MESENTERY/BOWEL: No evidence for bowel obstruction, ascites, or pneumoperitoneum.   BONES/SOFT TISSUES: Presacral edema. Anasarca. Osteopenia with degenerative changes of the spine.  OTHER: Diffuse vascular calcifications.    IMPRESSION:   Right greater than left pleural effusions.  Marked right heart enlargement with reflux contrast into the IVC and hepatic veins, compatible with right heart failure.  Cholecystostomy tube in gallbladder fossa.  There is increased soft tissue density in the presacral space etiology not apparent on this exam however rectal pathology is a consideration.    MIRIAM CARABALLO M.D., RESIDENT RADIOLOGIST  This document has been electronically signed.  AMADOR HAMILTON M.D., ATTENDING RADIOLOGIST  This document has been electronically signed. 2020  6:06PM  (20 @ 17:08)      CARDIOLOGY TESTING      All available historical records have been reviewed    MEDICATIONS  chlorhexidine 4% Liquid 1  heparin  Infusion 10  linezolid  IVPB 600  meropenem  IVPB 1000  metoprolol succinate ER 25      ANTIBIOTICS:  linezolid  IVPB 600 milliGRAM(s) IV Intermittent every 12 hours  meropenem  IVPB 1000 milliGRAM(s) IV Intermittent every 12 hours      All available historical data has been reviewed ASDE HARRIS  89y, Female  Allergy: penicillins (Other (U))  sulfonamides (Other (U))      CHIEF COMPLAINT: respiratory distress (2020 10:20)      HPI:  89yFemale with a past medical history of paroxysmal afib on Eliquis, chronic diastolic CHF, mitral regurgitation, diverticulosis, chronic Smith, right eye blindness who presents from [facility] via EMS with respiratory distress x2 days. Per ED, patient arrived SOB with accessory muscle use, saturating at 75% on 3L NC, and 100% on non-rebreather. Currently, patient is AAO x0, arousable but confused, denies any pain or SOB, but states she feels "miserable." Boyfriend of 30 years is at bedside. He states patient has had mild dementia over the past year, however she lives alone and is normally independent at baseline, with some help from her son. He reports she has been hospitalized at [facility] 3 times over the past few months. Most recently, she was admitted to [facility] for septic shock secondary to pneumonia, complicated by RITU and cholecystectomy s/p drainage tube. She was discharged to [facility] on Aztreonam, Flagyl, Zyvox (however Zyvox was never given). At the nursing home, she had decreased appetite, + cough, and began to have vivid hallucinations of people breaking into her home. She was brought into the ED for respiratory distress and was found to be febrile and hypoxic. Boyfriend is unsure about timeline of events, including when patient was last hospitalized and when she was discharged to the nursing home.    Infectious Diseases History:  Old Micro:  Cholecystectomy drain infection    FAMILY HISTORY:  None.    PAST MEDICAL & SURGICAL HISTORY:  Paroxysmal afib on Eliquis  Diastolic CHF  Mitral regurgitation  Chronic Smith  Diverticulosis  GERD  Cholecystostomy drain infection  Hysterectomy  Tonsillectomy  Cataract surgery    SOCIAL HISTORY    Substance Use ( X ) never used  (  ) IVDU (  ) Other:  Tobacco Usage:  (   ) never smoked   ( X  ) former smoker   (   ) current smoker   Alcohol Usage: ( X  ) social  (   ) daily use (   ) denies  Sexual History: Boyfriend of 30 years, at bedside.      ROS  Unable to obtain. Patient AAO x0. She denies any pain or SOB.    VITALS:  T(F): 96.4, Max: 101.7 (20 @ 11:55)  HR: 88  BP: 106/72  RR: 18Vital Signs Last 24 Hrs  T(C): 35.8 (2020 08:39), Max: 38.7 (2020 11:55)  T(F): 96.4 (2020 08:39), Max: 101.7 (2020 11:55)  HR: 88 (2020 08:39) (80 - 127)  BP: 106/72 (2020 08:39) (91/65 - 173/114)  BP(mean): --  RR: 18 (2020 08:39) (17 - 28)  SpO2: 99% (2020 08:39) (99% - 100%)    PHYSICAL EXAM:  Gen: Lethargic, lying in bed, NC in place.  HEENT: Normocephalic, atraumatic  Neck: +Nuchal rigidity. No lymphadenopathy  CV: Slightly tachycardic. Normal S1 and S2. No murmur appreciated.  Lungs: Bilateral crackles at bases, worse on the right.  Abdomen: Soft, nontender, BS present. Dressing over RUQ cholecystectomy drain.  : Smith catheter in position, +hematuria.   Ext:  Bilateral pitting lower extremity edema. Warm, well perfused.  Neuro: Lethargic. Photophobic. Negative Kernig and Brudzinski sign.  Skin: No rash, no lesions.    TESTS & MEASUREMENTS:                        13.3   11.66 )-----------( 80       ( 2020 11:40 )             40.7         143  |  112<H>  |  65<HH>  ----------------------------<  127<H>  4.2   |  17  |  1.3    Ca    8.2<L>      2020 00:37  Mg     2.1         TPro  4.9<L>  /  Alb  2.7<L>  /  TBili  1.3<H>  /  DBili  0.7<H>  /  AST  43<H>  /  ALT  146<H>  /  AlkPhos  56      eGFR if Non African American: 36 mL/min/1.73M2 (20 @ 00:37)  eGFR if : 42 mL/min/1.73M2 (20 @ 00:37)  eGFR if Non African American: 28 mL/min/1.73M2 (20 @ 11:40)  eGFR if : 33 mL/min/1.73M2 (20 @ 11:40)    LIVER FUNCTIONS - ( 2020 00:37 )  Alb: 2.7 g/dL / Pro: 4.9 g/dL / ALK PHOS: 56 U/L / ALT: 146 U/L / AST: 43 U/L / GGT: x           Urinalysis Basic - ( 2020 15:50 )  Color: Yellow / Appearance: Clear / S.023 / pH: x  Gluc: x / Ketone: Negative  / Bili: Negative / Urobili: <2 mg/dL   Blood: x / Protein: 30 mg/dL / Nitrite: Negative   Leuk Esterase: Negative / RBC: 2 /HPF / WBC 1 /HPF   Sq Epi: x / Non Sq Epi: 2 /HPF / Bacteria: Negative      Lactate, Blood: 2.5 mmol/L (20 @ 00:37)  Blood Gas Venous - Lactate: 3.9 mmoL/L (20 @ 13:17)  Lactate, Blood: 3.7 mmol/L (20 @ 11:40)  Blood Gas Venous - Lactate: 3.5 mmoL/L (20 @ 15:20)      INFECTIOUS DISEASES TESTING  MRSA PCR Result.: Negative (20 @ 00:15)      RADIOLOGY & ADDITIONAL TESTS:    I have personally reviewed the last Chest xray    EXAM:  XR CHEST PORTABLE IMMED 1V        PROCEDURE DATE:  2020    INTERPRETATION:  Clinical History / Reason for exam: Sepsis    Impression:    Bilateral lung opacities, predominantly at the lung bases    CROW ASHRAF M.D., ATTENDING RADIOLOGIST  This document has been electronically signed. 2020  4:31PM          CT Abdomen and Pelvis w/ IV Cont:   EXAM:  CT ABDOMEN AND PELVIS IC        PROCEDURE DATE:  2020    INTERPRETATION:  CLINICAL STATEMENT: Sepsis.    TECHNIQUE: Contiguous axial CT images were obtained from the lower chest to the pubic symphysis following administration of 100cc Optiray 320 intravenous contrast.  Oral contrast was not administered.  Reformatted images in the coronal and sagittal planes were acquired.  COMPARISON: 2012    FINDINGS:  LOWER CHEST: Partially imaged right greater than leftpleural effusions with adjacent atelectasis. Marked right heart enlargement with reflux of contrast into the IVC and hepatic veins.  HEPATOBILIARY: Cholecystostomy tube seen in the gallbladder fossa, possibly within a collapsed gallbladder.  SPLEEN: Unremarkable.  PANCREAS: Unremarkable.  ADRENAL GLANDS: Unremarkable.  KIDNEYS: No hydronephrosis. 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  PELVIC ORGANS: Urinary bladdercatheter collapsed around a Smith catheter. There is increased soft tissue density in the presacral space etiology not apparent on this exam however rectal pathology is a consideration.  PERITONEUM/MESENTERY/BOWEL: No evidence for bowel obstruction, ascites, or pneumoperitoneum.   BONES/SOFT TISSUES: Presacral edema. Anasarca. Osteopenia with degenerative changes of the spine.  OTHER: Diffuse vascular calcifications.    IMPRESSION:   Right greater than left pleural effusions.  Marked right heart enlargement with reflux contrast into the IVC and hepatic veins, compatible with right heart failure.  Cholecystostomy tube in gallbladder fossa.  There is increased soft tissue density in the presacral space etiology not apparent on this exam however rectal pathology is a consideration.    MIRIAM CARABALLO M.D., RESIDENT RADIOLOGIST  This document has been electronically signed.  AMADOR HAMILTON M.D., ATTENDING RADIOLOGIST  This document has been electronically signed. 2020  6:06PM  (20 @ 17:08)      CARDIOLOGY TESTING      All available historical records have been reviewed    MEDICATIONS  chlorhexidine 4% Liquid 1  heparin  Infusion 10  linezolid  IVPB 600  meropenem  IVPB 1000  metoprolol succinate ER 25      ANTIBIOTICS:  linezolid  IVPB 600 milliGRAM(s) IV Intermittent every 12 hours  meropenem  IVPB 1000 milliGRAM(s) IV Intermittent every 12 hours      All available historical data has been reviewed SADE HARRIS  89y, Female  Allergy: penicillins (Other (U))  sulfonamides (Other (U))      CHIEF COMPLAINT: respiratory distress (2020 10:20)      HPI:  89yFemale with a past medical history of paroxysmal afib on Eliquis, chronic diastolic CHF, mitral regurgitation, diverticulosis, chronic Smith, right eye blindness who presents from [facility] via EMS with respiratory distress x2 days. Per ED, patient arrived SOB with accessory muscle use, saturating at 75% on 3L NC, and 100% on non-rebreather. Currently, patient is AAO x0, arousable but confused, denies any pain or SOB, but states she feels "miserable." Boyfriend of 30 years is at bedside. He states patient has had mild dementia over the past year, however she lives alone and is normally independent at baseline, with some help from her son. He reports she has been hospitalized at [facility] 3 times over the past few months. Most recently, she was admitted to [facility] for septic shock secondary to pneumonia, complicated by RITU and cholecystectomy s/p drainage tube. She was discharged to [facility] on Aztreonam, Flagyl, Zyvox (however Zyvox was never given). At the nursing home, she had decreased appetite, + cough, and began to have vivid hallucinations of people breaking into her home. She was brought into the ED for respiratory distress and was found to be febrile and hypoxic. Boyfriend is unsure about timeline of events, including when patient was last hospitalized and when she was discharged to the nursing home.    Infectious Diseases History:  Old Micro:  Cholecystectomy drain infection    FAMILY HISTORY:  None.    PAST MEDICAL & SURGICAL HISTORY:  Paroxysmal afib on Eliquis  Diastolic CHF  Mitral regurgitation  Chronic Smith  Diverticulosis  GERD  Cholecystostomy drain infection  Hysterectomy  Tonsillectomy  Cataract surgery    SOCIAL HISTORY    Substance Use ( X ) never used  (  ) IVDU (  ) Other:  Tobacco Usage:  (   ) never smoked   ( X  ) former smoker   (   ) current smoker   Alcohol Usage: ( X  ) social  (   ) daily use (   ) denies  Sexual History: Boyfriend of 30 years, at bedside.      ROS  Unable to obtain. Patient AAO x0. She denies any pain or SOB.    VITALS:  T(F): 96.4, Max: 101.7 (20 @ 11:55)  HR: 88  BP: 106/72  RR: 18Vital Signs Last 24 Hrs  T(C): 35.8 (2020 08:39), Max: 38.7 (2020 11:55)  T(F): 96.4 (2020 08:39), Max: 101.7 (2020 11:55)  HR: 88 (2020 08:39) (80 - 127)  BP: 106/72 (2020 08:39) (91/65 - 173/114)  BP(mean): --  RR: 18 (2020 08:39) (17 - 28)  SpO2: 99% (2020 08:39) (99% - 100%)    PHYSICAL EXAM:  Gen: Lethargic, lying in bed, NC in place.  HEENT: Normocephalic, atraumatic  Neck: +Nuchal rigidity. No lymphadenopathy  CV: Slightly tachycardic. Normal S1 and S2. No murmur appreciated.  Lungs: Bilateral crackles at bases, worse on the right.  Abdomen: Soft, nontender, BS present. Dressing over RUQ cholecystectomy drain.  : Smith catheter in position, +hematuria.   Ext:  Bilateral pitting lower extremity edema. Warm, well perfused.  Neuro: Lethargic. Photophobic. Negative Kernig and Brudzinski sign.  Skin: No rash, no lesions.    TESTS & MEASUREMENTS:               LABS:  cret                        13.4   13.83 )-----------( 63       ( 2020 11:26 )             41.0     01-13    143  |  113<H>  |  64<HH>  ----------------------------<  121<H>  4.7   |  14<L>  |  1.3    Ca    8.3<L>      2020 11:26  Mg     2.2     13    TPro  5.0<L>  /  Alb  2.9<L>  /  TBili  1.3<H>  /  DBili  x   /  AST  49<H>  /  ALT  137<H>  /  AlkPhos  57  01-13    PT/INR - ( 2020 11:26 )   PT: 27.70 sec;   INR: 2.43 ratio         PTT - ( 2020 11:26 )  PTT:34.0 sec      eGFR if Non African American: 36 mL/min/1.73M2 (20 @ 00:37)  eGFR if : 42 mL/min/1.73M2 (20 @ 00:37)  eGFR if Non African American: 28 mL/min/1.73M2 (20 @ 11:40)  eGFR if : 33 mL/min/1.73M2 (20 @ 11:40)    LIVER FUNCTIONS - ( 2020 00:37 )  Alb: 2.7 g/dL / Pro: 4.9 g/dL / ALK PHOS: 56 U/L / ALT: 146 U/L / AST: 43 U/L / GGT: x           Urinalysis Basic - ( 2020 15:50 )  Color: Yellow / Appearance: Clear / S.023 / pH: x  Gluc: x / Ketone: Negative  / Bili: Negative / Urobili: <2 mg/dL   Blood: x / Protein: 30 mg/dL / Nitrite: Negative   Leuk Esterase: Negative / RBC: 2 /HPF / WBC 1 /HPF   Sq Epi: x / Non Sq Epi: 2 /HPF / Bacteria: Negative      Lactate, Blood: 2.5 mmol/L (20 @ 00:37)  Blood Gas Venous - Lactate: 3.9 mmoL/L (20 @ 13:17)  Lactate, Blood: 3.7 mmol/L (20 @ 11:40)  Blood Gas Venous - Lactate: 3.5 mmoL/L (20 @ 15:20)      INFECTIOUS DISEASES TESTING  MRSA PCR Result.: Negative (20 @ 00:15)      RADIOLOGY & ADDITIONAL TESTS:    I have personally reviewed the last Chest xray    EXAM:  XR CHEST PORTABLE IMMED 1V        PROCEDURE DATE:  2020    INTERPRETATION:  Clinical History / Reason for exam: Sepsis    Impression:    Bilateral lung opacities, predominantly at the lung bases    CROW ASHRAF M.D., ATTENDING RADIOLOGIST  This document has been electronically signed. 2020  4:31PM          CT Abdomen and Pelvis w/ IV Cont:   EXAM:  CT ABDOMEN AND PELVIS IC        PROCEDURE DATE:  2020    INTERPRETATION:  CLINICAL STATEMENT: Sepsis.    TECHNIQUE: Contiguous axial CT images were obtained from the lower chest to the pubic symphysis following administration of 100cc Optiray 320 intravenous contrast.  Oral contrast was not administered.  Reformatted images in the coronal and sagittal planes were acquired.  COMPARISON: 2012    FINDINGS:  LOWER CHEST: Partially imaged right greater than leftpleural effusions with adjacent atelectasis. Marked right heart enlargement with reflux of contrast into the IVC and hepatic veins.  HEPATOBILIARY: Cholecystostomy tube seen in the gallbladder fossa, possibly within a collapsed gallbladder.  SPLEEN: Unremarkable.  PANCREAS: Unremarkable.  ADRENAL GLANDS: Unremarkable.  KIDNEYS: No hydronephrosis. 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  PELVIC ORGANS: Urinary bladdercatheter collapsed around a Smith catheter. There is increased soft tissue density in the presacral space etiology not apparent on this exam however rectal pathology is a consideration.  PERITONEUM/MESENTERY/BOWEL: No evidence for bowel obstruction, ascites, or pneumoperitoneum.   BONES/SOFT TISSUES: Presacral edema. Anasarca. Osteopenia with degenerative changes of the spine.  OTHER: Diffuse vascular calcifications.    IMPRESSION:   Right greater than left pleural effusions.  Marked right heart enlargement with reflux contrast into the IVC and hepatic veins, compatible with right heart failure.  Cholecystostomy tube in gallbladder fossa.  There is increased soft tissue density in the presacral space etiology not apparent on this exam however rectal pathology is a consideration.    MIRIAM CARABALLO M.D., RESIDENT RADIOLOGIST  This document has been electronically signed.  AMADOR HAMILTON M.D., ATTENDING RADIOLOGIST  This document has been electronically signed. 2020  6:06PM  (20 @ 17:08)      CARDIOLOGY TESTING      All available historical records have been reviewed    MEDICATIONS  chlorhexidine 4% Liquid 1  heparin  Infusion 10  linezolid  IVPB 600  meropenem  IVPB 1000  metoprolol succinate ER 25      ANTIBIOTICS:  linezolid  IVPB 600 milliGRAM(s) IV Intermittent every 12 hours  meropenem  IVPB 1000 milliGRAM(s) IV Intermittent every 12 hours      All available historical data has been reviewed SADE HARRIS  89y, Female  Allergy: penicillins (Other (U))  sulfonamides (Other (U))      CHIEF COMPLAINT: respiratory distress (2020 10:20)      HPI:  89yFemale with a past medical history of paroxysmal afib on Eliquis, chronic diastolic CHF, mitral regurgitation, diverticulosis, chronic Smith, right eye blindness who presents from [facility] via EMS with respiratory distress x2 days. Per ED, patient arrived SOB with accessory muscle use, saturating at 75% on 3L NC, and 100% on non-rebreather. Currently, patient is AAO x1, arousable but confused, denies any pain or SOB, but states she feels "miserable." Boyfriend of 30 years is at bedside. He states patient has had mild dementia over the past year, however she lives alone and is normally independent at baseline, with some help from her son. He reports she has been hospitalized at [facility] 3 times over the past few months. Most recently, she was admitted to [facility] for septic shock secondary to pneumonia, complicated by RITU and cholecystectomy s/p drainage tube. She was discharged to [facility] on Aztreonam, Flagyl, Zyvox (however Zyvox was never given). At the nursing home, she had decreased appetite, + cough, and began to have vivid hallucinations of people breaking into her home. She was brought into the ED for respiratory distress and was found to be febrile and hypoxic. Boyfriend is unsure about timeline of events, including when patient was last hospitalized and when she was discharged to the nursing home.    Infectious Diseases History:  Old Micro:  Cholecystectomy drain infection    FAMILY HISTORY:  None.    PAST MEDICAL & SURGICAL HISTORY:  Paroxysmal afib on Eliquis  Diastolic CHF  Mitral regurgitation  Chronic Smith  Diverticulosis  GERD  Cholecystostomy drain infection  Hysterectomy  Tonsillectomy  Cataract surgery    SOCIAL HISTORY    Substance Use ( X ) never used  (  ) IVDU (  ) Other:  Tobacco Usage:  (   ) never smoked   ( X  ) former smoker   (   ) current smoker   Alcohol Usage: ( X  ) social  (   ) daily use (   ) denies  Sexual History: Boyfriend of 30 years, at bedside.      ROS  Unable to obtain. Patient AAO x1. She denies any pain or SOB.    VITALS:  T(F): 96.4, Max: 101.7 (20 @ 11:55)  HR: 88  BP: 106/72  RR: 18Vital Signs Last 24 Hrs  T(C): 35.8 (2020 08:39), Max: 38.7 (2020 11:55)  T(F): 96.4 (2020 08:39), Max: 101.7 (2020 11:55)  HR: 88 (2020 08:39) (80 - 127)  BP: 106/72 (2020 08:39) (91/65 - 173/114)  BP(mean): --  RR: 18 (2020 08:39) (17 - 28)  SpO2: 99% (2020 08:39) (99% - 100%)    PHYSICAL EXAM:  Gen: Lethargic, lying in bed, NC in place.  HEENT: Normocephalic, atraumatic  Neck: Supple. No lymphadenopathy  CV: Slightly tachycardic. Normal S1 and S2. No murmur appreciated.  Lungs: Bilateral crackles at bases, worse on the right.  Abdomen: Soft, nontender, BS present. Dressing over RUQ cholecystectomy drain.  : Smith catheter in position, +hematuria.   Ext:  Bilateral pitting lower extremity edema. Warm, well perfused.  Neuro: Lethargic. Negative Kernig and Brudzinski sign. Mild photophobia.   Skin: No rash, no lesions.    TESTS & MEASUREMENTS:               LABS:  cret                        13.4   13.83 )-----------( 63       ( 2020 11:26 )             41.0     01-13    143  |  113<H>  |  64<HH>  ----------------------------<  121<H>  4.7   |  14<L>  |  1.3    Ca    8.3<L>      2020 11:26  Mg     2.2     -13    TPro  5.0<L>  /  Alb  2.9<L>  /  TBili  1.3<H>  /  DBili  x   /  AST  49<H>  /  ALT  137<H>  /  AlkPhos  57  01-13    PT/INR - ( 2020 11:26 )   PT: 27.70 sec;   INR: 2.43 ratio         PTT - ( 2020 11:26 )  PTT:34.0 sec      eGFR if Non African American: 36 mL/min/1.73M2 (20 @ 00:37)  eGFR if : 42 mL/min/1.73M2 (20 @ 00:37)  eGFR if Non African American: 28 mL/min/1.73M2 (20 @ 11:40)  eGFR if : 33 mL/min/1.73M2 (20 @ 11:40)    LIVER FUNCTIONS - ( 2020 00:37 )  Alb: 2.7 g/dL / Pro: 4.9 g/dL / ALK PHOS: 56 U/L / ALT: 146 U/L / AST: 43 U/L / GGT: x           Urinalysis Basic - ( 2020 15:50 )  Color: Yellow / Appearance: Clear / S.023 / pH: x  Gluc: x / Ketone: Negative  / Bili: Negative / Urobili: <2 mg/dL   Blood: x / Protein: 30 mg/dL / Nitrite: Negative   Leuk Esterase: Negative / RBC: 2 /HPF / WBC 1 /HPF   Sq Epi: x / Non Sq Epi: 2 /HPF / Bacteria: Negative      Lactate, Blood: 2.5 mmol/L (20 @ 00:37)  Blood Gas Venous - Lactate: 3.9 mmoL/L (20 @ 13:17)  Lactate, Blood: 3.7 mmol/L (20 @ 11:40)  Blood Gas Venous - Lactate: 3.5 mmoL/L (20 @ 15:20)      INFECTIOUS DISEASES TESTING  MRSA PCR Result.: Negative (20 @ 00:15)      RADIOLOGY & ADDITIONAL TESTS:    I have personally reviewed the last Chest xray    EXAM:  XR CHEST PORTABLE IMMED 1V        PROCEDURE DATE:  2020    INTERPRETATION:  Clinical History / Reason for exam: Sepsis    Impression:    Bilateral lung opacities, predominantly at the lung bases    CROW ASHRAF M.D., ATTENDING RADIOLOGIST  This document has been electronically signed. 2020  4:31PM          CT Abdomen and Pelvis w/ IV Cont:   EXAM:  CT ABDOMEN AND PELVIS IC        PROCEDURE DATE:  2020    INTERPRETATION:  CLINICAL STATEMENT: Sepsis.    TECHNIQUE: Contiguous axial CT images were obtained from the lower chest to the pubic symphysis following administration of 100cc Optiray 320 intravenous contrast.  Oral contrast was not administered.  Reformatted images in the coronal and sagittal planes were acquired.  COMPARISON: 2012    FINDINGS:  LOWER CHEST: Partially imaged right greater than leftpleural effusions with adjacent atelectasis. Marked right heart enlargement with reflux of contrast into the IVC and hepatic veins.  HEPATOBILIARY: Cholecystostomy tube seen in the gallbladder fossa, possibly within a collapsed gallbladder.  SPLEEN: Unremarkable.  PANCREAS: Unremarkable.  ADRENAL GLANDS: Unremarkable.  KIDNEYS: No hydronephrosis. 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  PELVIC ORGANS: Urinary bladdercatheter collapsed around a Smith catheter. There is increased soft tissue density in the presacral space etiology not apparent on this exam however rectal pathology is a consideration.  PERITONEUM/MESENTERY/BOWEL: No evidence for bowel obstruction, ascites, or pneumoperitoneum.   BONES/SOFT TISSUES: Presacral edema. Anasarca. Osteopenia with degenerative changes of the spine.  OTHER: Diffuse vascular calcifications.    IMPRESSION:   Right greater than left pleural effusions.  Marked right heart enlargement with reflux contrast into the IVC and hepatic veins, compatible with right heart failure.  Cholecystostomy tube in gallbladder fossa.  There is increased soft tissue density in the presacral space etiology not apparent on this exam however rectal pathology is a consideration.    MIRIAM CARABALLO M.D., RESIDENT RADIOLOGIST  This document has been electronically signed.  AMADOR HAMILTON M.D., ATTENDING RADIOLOGIST  This document has been electronically signed. 2020  6:06PM  (20 @ 17:08)      CARDIOLOGY TESTING      All available historical records have been reviewed    MEDICATIONS  chlorhexidine 4% Liquid 1  heparin  Infusion 10  linezolid  IVPB 600  meropenem  IVPB 1000  metoprolol succinate ER 25      ANTIBIOTICS:  linezolid  IVPB 600 milliGRAM(s) IV Intermittent every 12 hours  meropenem  IVPB 1000 milliGRAM(s) IV Intermittent every 12 hours      All available historical data has been reviewed SADE HARRIS  89y, Female  Allergy: penicillins (Other (U))  sulfonamides (Other (U))      CHIEF COMPLAINT: respiratory distress (2020 10:20)      HPI:  89yFemale with a past medical history of paroxysmal afib on Eliquis, chronic diastolic CHF, mitral regurgitation, diverticulosis, chronic Smith, right eye blindness who presents from [facility] via EMS with respiratory distress x2 days. Per ED, patient arrived SOB with accessory muscle use, saturating at 75% on 3L NC, and 100% on non-rebreather. Currently, patient is AAO x1, arousable but confused, denies any pain or SOB, but states she feels "miserable." Boyfriend of 30 years is at bedside. He states patient has had mild dementia over the past year, however she lives alone and is normally independent at baseline, with some help from her son. He reports she has been hospitalized at [facility] 3 times over the past few months. Most recently, she was admitted to [facility] for septic shock secondary to pneumonia, complicated by RITU and cholecystectomy s/p drainage tube. She was discharged to [facility] on Aztreonam, Flagyl, Zyvox (however Zyvox was never given). At the nursing home, she had decreased appetite, + cough, and began to have vivid hallucinations of people breaking into her home. She was brought into the ED for respiratory distress and was found to be febrile and hypoxic. Boyfriend is unsure about timeline of events, including when patient was last hospitalized and when she was discharged to the nursing home.    Infectious Diseases History:  Old Micro:NA  Cholecystectomy drain infection    FAMILY HISTORY:  None.    PAST MEDICAL & SURGICAL HISTORY:  Paroxysmal afib on Eliquis  Diastolic CHF  Mitral regurgitation  Chronic Smith  Diverticulosis  GERD  Cholecystostomy drain infection  Hysterectomy  Tonsillectomy  Cataract surgery    SOCIAL HISTORY    Substance Use ( X ) never used  (  ) IVDU (  ) Other:  Tobacco Usage:  (   ) never smoked   ( X  ) former smoker   (   ) current smoker   Alcohol Usage: ( X  ) social  (   ) daily use (   ) denies  Sexual History: Boyfriend of 30 years, at bedside.      ROS  Unable to obtain. Patient AAO x1. She denies any pain or SOB.    VITALS:  T(F): 96.4, Max: 101.7 (20 @ 11:55)  HR: 88  BP: 106/72  RR: 18Vital Signs Last 24 Hrs  T(C): 35.8 (2020 08:39), Max: 38.7 (2020 11:55)  T(F): 96.4 (2020 08:39), Max: 101.7 (2020 11:55)  HR: 88 (2020 08:39) (80 - 127)  BP: 106/72 (2020 08:39) (91/65 - 173/114)  BP(mean): --  RR: 18 (2020 08:39) (17 - 28)  SpO2: 99% (2020 08:39) (99% - 100%)    PHYSICAL EXAM:  Gen: Lethargic, lying in bed, NC in place.  HEENT: Normocephalic, atraumatic  Neck: Supple. No lymphadenopathy  CV: Slightly tachycardic. Normal S1 and S2. No murmur appreciated.  Lungs: Bilateral crackles at bases, worse on the right.  Abdomen: Soft, nontender, BS present. Dressing over RUQ cholecystectomy drain.  : Smith catheter in position, +hematuria.   Ext:  Bilateral pitting lower extremity edema. Warm, well perfused.  Neuro: Lethargic. Negative Kernig and Brudzinski sign. Mild photophobia.   Skin: No rash, no lesions.    TESTS & MEASUREMENTS:               LABS:  cret                        13.4   13.83 )-----------( 63       ( 2020 11:26 )             41.0     01-13    143  |  113<H>  |  64<HH>  ----------------------------<  121<H>  4.7   |  14<L>  |  1.3    Ca    8.3<L>      2020 11:26  Mg     2.2     -13    TPro  5.0<L>  /  Alb  2.9<L>  /  TBili  1.3<H>  /  DBili  x   /  AST  49<H>  /  ALT  137<H>  /  AlkPhos  57  01-13    PT/INR - ( 2020 11:26 )   PT: 27.70 sec;   INR: 2.43 ratio         PTT - ( 2020 11:26 )  PTT:34.0 sec      eGFR if Non African American: 36 mL/min/1.73M2 (20 @ 00:37)  eGFR if : 42 mL/min/1.73M2 (20 @ 00:37)  eGFR if Non African American: 28 mL/min/1.73M2 (20 @ 11:40)  eGFR if : 33 mL/min/1.73M2 (20 @ 11:40)    LIVER FUNCTIONS - ( 2020 00:37 )  Alb: 2.7 g/dL / Pro: 4.9 g/dL / ALK PHOS: 56 U/L / ALT: 146 U/L / AST: 43 U/L / GGT: x           Urinalysis Basic - ( 2020 15:50 )  Color: Yellow / Appearance: Clear / S.023 / pH: x  Gluc: x / Ketone: Negative  / Bili: Negative / Urobili: <2 mg/dL   Blood: x / Protein: 30 mg/dL / Nitrite: Negative   Leuk Esterase: Negative / RBC: 2 /HPF / WBC 1 /HPF   Sq Epi: x / Non Sq Epi: 2 /HPF / Bacteria: Negative      Lactate, Blood: 2.5 mmol/L (20 @ 00:37)  Blood Gas Venous - Lactate: 3.9 mmoL/L (20 @ 13:17)  Lactate, Blood: 3.7 mmol/L (20 @ 11:40)  Blood Gas Venous - Lactate: 3.5 mmoL/L (20 @ 15:20)      INFECTIOUS DISEASES TESTING  MRSA PCR Result.: Negative (20 @ 00:15)      RADIOLOGY & ADDITIONAL TESTS:    I have personally reviewed the last Chest xray    EXAM:  XR CHEST PORTABLE IMMED 1V        PROCEDURE DATE:  2020    INTERPRETATION:  Clinical History / Reason for exam: Sepsis    Impression:    Bilateral lung opacities, predominantly at the lung bases    CROW ASHRAF M.D., ATTENDING RADIOLOGIST  This document has been electronically signed. 2020  4:31PM          CT Abdomen and Pelvis w/ IV Cont:   EXAM:  CT ABDOMEN AND PELVIS IC        PROCEDURE DATE:  2020    INTERPRETATION:  CLINICAL STATEMENT: Sepsis.    TECHNIQUE: Contiguous axial CT images were obtained from the lower chest to the pubic symphysis following administration of 100cc Optiray 320 intravenous contrast.  Oral contrast was not administered.  Reformatted images in the coronal and sagittal planes were acquired.  COMPARISON: 2012    FINDINGS:  LOWER CHEST: Partially imaged right greater than leftpleural effusions with adjacent atelectasis. Marked right heart enlargement with reflux of contrast into the IVC and hepatic veins.  HEPATOBILIARY: Cholecystostomy tube seen in the gallbladder fossa, possibly within a collapsed gallbladder.  SPLEEN: Unremarkable.  PANCREAS: Unremarkable.  ADRENAL GLANDS: Unremarkable.  KIDNEYS: No hydronephrosis. 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  PELVIC ORGANS: Urinary bladdercatheter collapsed around a Smith catheter. There is increased soft tissue density in the presacral space etiology not apparent on this exam however rectal pathology is a consideration.  PERITONEUM/MESENTERY/BOWEL: No evidence for bowel obstruction, ascites, or pneumoperitoneum.   BONES/SOFT TISSUES: Presacral edema. Anasarca. Osteopenia with degenerative changes of the spine.  OTHER: Diffuse vascular calcifications.    IMPRESSION:   Right greater than left pleural effusions.  Marked right heart enlargement with reflux contrast into the IVC and hepatic veins, compatible with right heart failure.  Cholecystostomy tube in gallbladder fossa.  There is increased soft tissue density in the presacral space etiology not apparent on this exam however rectal pathology is a consideration.    MIRIAM CARABALLO M.D., RESIDENT RADIOLOGIST  This document has been electronically signed.  AMADOR HAMILTON M.D., ATTENDING RADIOLOGIST  This document has been electronically signed. 2020  6:06PM  (20 @ 17:08)      CARDIOLOGY TESTING      All available historical records have been reviewed    MEDICATIONS  chlorhexidine 4% Liquid 1  heparin  Infusion 10  linezolid  IVPB 600  meropenem  IVPB 1000  metoprolol succinate ER 25      ANTIBIOTICS:  linezolid  IVPB 600 milliGRAM(s) IV Intermittent every 12 hours  meropenem  IVPB 1000 milliGRAM(s) IV Intermittent every 12 hours      All available historical data has been reviewed

## 2020-01-13 NOTE — PROGRESS NOTE ADULT - SUBJECTIVE AND OBJECTIVE BOX
PT SEEN, ACCUSABLE BUT POOR HISTORIAN - NO HISTORY OBTAINABLE   REFUSED BLOOD THIS AM ??  AFEB THIS AM       89 year old with pmhx of Afib on Eliquis, diverticulosis, GERD, chronic Smith right eye blindness, presents from NH with respiratory distress for 2 days duration.   Patient was discharged to NH from [facility] after being treated for septic shock secondary to PNA, her previous hospitalization was complicated with cholecystitis managed with cholecystostomy tube, and RITU.   Per family she is lethargic and poorly communicate since she admitted to NH , has been having worsening SOB for the last 2 days, SOB is associated with cough, runny nose, denies chest pain, urinary symptoms, abd pain.   per NH nurse, patient ahd low appetite, poor PO intake, was not participating in rehab. was not complaining of any other symptoms.   was discharged to NH on Aztreonam, flagyl and zyvox. the last one was never given there due to delay in delivery.   per family; at baseline she has mild dementia and she was fully independent before recent illness last week.  in ed 127/114, hr 127, 100% o2 sat on 8 Ls, temp 101.7 lactate 3.8, CXR showed b/l lower lobe opacities.   discharged to NH with a Smith catheter that was renewed in ED.   had ct abd in ED that showed Marked right heart enlargement with reflux contrast into the IVC and hepatic veins, compatible with right heart failure. Cholecystostomy tube in gallbladder fossa. and increased soft tissue density in the presacral space etiology not apparent on this exam however rectal pathology is a consideration.    PAST MEDICAL & SURGICAL HISTORY:  Afib  Cholecystostomy drain infection PT SEEN, ACCUSABLE BUT POOR HISTORIAN - NO HISTORY OBTAINABLE   REFUSED BLOOD THIS AM ??  AFEB THIS AM       89 year old with pmhx of Afib on Eliquis, diverticulosis, GERD, chronic Smith right eye blindness, presents from NH with respiratory distress for 2 days duration.   Patient was discharged to NH from [facility] after being treated for septic shock secondary to PNA, her previous hospitalization was complicated with cholecystitis managed with cholecystostomy tube, and RITU.   Per family she is lethargic and poorly communicate since she admitted to NH , has been having worsening SOB for the last 2 days, SOB is associated with cough, runny nose, denies chest pain, urinary symptoms, abd pain.   per NH nurse, patient ahd low appetite, poor PO intake, was not participating in rehab. was not complaining of any other symptoms.   was discharged to NH on Aztreonam, flagyl and zyvox. the last one was never given there due to delay in delivery.   per family; at baseline she has mild dementia and she was fully independent before recent illness last week.  in ed 127/114, hr 127, 100% o2 sat on 8 Ls, temp 101.7 lactate 3.8, CXR showed b/l lower lobe opacities.   discharged to NH with a Smith catheter that was renewed in ED.   had ct abd in ED that showed Marked right heart enlargement with reflux contrast into the IVC and hepatic veins, compatible with right heart failure. Cholecystostomy tube in gallbladder fossa. and increased soft tissue density in the presacral space etiology not apparent on this exam however rectal pathology is a consideration.    Allergies    penicillins (Other (U))  sulfonamides (Other (U))    Intolerances    MEDICATIONS  (STANDING):  chlorhexidine 4% Liquid 1 Application(s) Topical <User Schedule>  heparin  Infusion 10 Unit(s)/Hr (0.1 mL/Hr) IV Continuous <Continuous>  linezolid  IVPB 600 milliGRAM(s) IV Intermittent every 12 hours  meropenem  IVPB 1000 milliGRAM(s) IV Intermittent every 12 hours  metoprolol succinate ER 25 milliGRAM(s) Oral daily    Vital Signs Last 24 Hrs  T(C): 35.8 (13 Jan 2020 08:39), Max: 38.7 (12 Jan 2020 11:55)  T(F): 96.4 (13 Jan 2020 08:39), Max: 101.7 (12 Jan 2020 11:55)  HR: 88 (13 Jan 2020 08:39) (80 - 127)  BP: 106/72 (13 Jan 2020 08:39) (91/65 - 173/114)  BP(mean): --  RR: 18 (13 Jan 2020 08:39) (17 - 28)  SpO2: 99% (13 Jan 2020 08:39) (99% - 100%)      GENERAL: NAD, lethargic  	HEAD:  Atraumatic, Normocephalic  	EYES: EOMI, PERRLA, conjunctiva and sclera clear  	ENT: Moist mucous membranes  	CHEST/LUNG: b/l basal crackles   	HEART: normal s1 s2   	ABDOMEN: Soft, Nontender, Nondistended. No hepatomegaly, cholecystostomy tube in place and draining   	EXTREMITIES:  brisk capillary refill. No clubbing, cyanosis LE b/l edema 2+   	NERVOUS SYSTEM:  Alert & Oriented X2,     SKIN: No rashes or lesions    01-13    143  |  112<H>  |  65<HH>  ----------------------------<  127<H>  4.2   |  17  |  1.3    Ca    8.2<L>      13 Jan 2020 00:37  Mg     2.1     01-12    TPro  4.9<L>  /  Alb  2.7<L>  /  TBili  1.3<H>  /  DBili  0.7<H>  /  AST  43<H>  /  ALT  146<H>  /  AlkPhos  56  01-13                            13.3   11.66 )-----------( 80       ( 12 Jan 2020 11:40 )             40.7     CAPILLARY BLOOD GLUCOSE        CARDIAC MARKERS ( 13 Jan 2020 00:37 )  x     / 0.06 ng/mL / x     / x     / 4.6 ng/mL

## 2020-01-14 LAB
APTT BLD: 43.7 SEC — HIGH (ref 27–39.2)
LEGIONELLA AG UR QL: NEGATIVE — SIGNIFICANT CHANGE UP
RAPID RVP RESULT: SIGNIFICANT CHANGE UP

## 2020-01-14 PROCEDURE — 99233 SBSQ HOSP IP/OBS HIGH 50: CPT

## 2020-01-14 PROCEDURE — 93970 EXTREMITY STUDY: CPT | Mod: 26

## 2020-01-14 RX ORDER — HEPARIN SODIUM 5000 [USP'U]/ML
1000 INJECTION INTRAVENOUS; SUBCUTANEOUS
Qty: 25000 | Refills: 0 | Status: DISCONTINUED | OUTPATIENT
Start: 2020-01-14 | End: 2020-01-14

## 2020-01-14 RX ORDER — FUROSEMIDE 40 MG
20 TABLET ORAL ONCE
Refills: 0 | Status: COMPLETED | OUTPATIENT
Start: 2020-01-14 | End: 2020-01-14

## 2020-01-14 RX ADMIN — MEROPENEM 100 MILLIGRAM(S): 1 INJECTION INTRAVENOUS at 13:37

## 2020-01-14 RX ADMIN — HEPARIN SODIUM 10 UNIT(S)/HR: 5000 INJECTION INTRAVENOUS; SUBCUTANEOUS at 07:34

## 2020-01-14 RX ADMIN — Medication 25 MILLIGRAM(S): at 07:34

## 2020-01-14 RX ADMIN — Medication 20 MILLIGRAM(S): at 13:38

## 2020-01-14 RX ADMIN — CHLORHEXIDINE GLUCONATE 1 APPLICATION(S): 213 SOLUTION TOPICAL at 07:34

## 2020-01-14 RX ADMIN — MEROPENEM 100 MILLIGRAM(S): 1 INJECTION INTRAVENOUS at 00:56

## 2020-01-14 NOTE — PROGRESS NOTE ADULT - ASSESSMENT
ASSESSMENT  89yFemale with a past medical history of paroxysmal afib on Eliquis, chronic diastolic CHF, mitral regurgitation, diverticulosis, chronic Smith, right eye blindness who presents from [facility] via EMS with respiratory distress x2 days. Patient admitted for acute hypoxic respiratory failure, with sepsis on admission.    IMPRESSION  #Severe sepsis on admission (T>101F, Pulse>90,  (+) lactic acidosis, metabolic encephalopathy, RITU     CXR demonstrates bilateral basilar lung opacities, suspected GNR PNA    MRSA PCR neg    Blood & Urine cxs NGTD     No evidence of worsening intraabdominal infection    RECOMMENDATIONS  -Obtain micro records from [facility]  -Continue Meropenem (as this occurred while on zosyn)  -RVP pending

## 2020-01-14 NOTE — CONSULT NOTE ADULT - SUBJECTIVE AND OBJECTIVE BOX
HPI:  89 year old with pmhx of Afib on Eliquis, diverticulosis, GERD, chronic Smith right eye blindness, presents from NH with respiratory distress for 2 days duration.   Patient was discharged to NH from [facility] after being treated for septic shock secondary to PNA, her previous hospitalization was complicated with cholecystitis managed with cholecystostomy tube, and RITU.   Per family she is lethargic and poorly communicate since she admitted to NH , has been having worsening SOB for the last 2 days, SOB is associated with cough, runny nose, denies chest pain, urinary symptoms, abd pain.   per NH nurse, patient ahd low appetite, poor PO intake, was not participating in rehab. was not complaining of any other symptoms.   was discharged to NH on Aztreonam, flagyl and zyvox. the last one was never given there due to delay in delivery.   per family; at baseline she has mild dementia and she was fully independent before recent illness last week.  in ed 127/114, hr 127, 100% o2 sat on 8 Ls, temp 101.7 lactate 3.8, CXR showed b/l lower lobe opacities.   discharged to NH with a Smith catheter that was renewed in ED.   had ct abd in ED that showed Marked right heart enlargement with reflux contrast into the IVC and hepatic veins, compatible with right heart failure. Cholecystostomy tube in gallbladder fossa. and increased soft tissue density in the presacral space etiology not apparent on this exam however rectal pathology is a consideration. (2020 20:58)      PAST MEDICAL & SURGICAL HISTORY:  Afib  Cholecystostomy drain infection      Hospital Course:    TODAY'S SUBJECTIVE & REVIEW OF SYMPTOMS:  Cognitive confused      MEDICATIONS  (STANDING):  chlorhexidine 4% Liquid 1 Application(s) Topical <User Schedule>  meropenem  IVPB 1000 milliGRAM(s) IV Intermittent every 12 hours  metoprolol succinate ER 25 milliGRAM(s) Oral daily    MEDICATIONS  (PRN):      FAMILY HISTORY:      Allergies    penicillins (Other (U))  sulfonamides (Other (U))    Intolerances        SOCIAL HISTORY:    [  ] Etoh  [  ] Smoking  [  ] Substance abuse     Home Environment:  [  ] Home Alone  [  ] Lives with Family  [  ] Home Health Aid    Dwelling:  [  ] Apartment  [  ] Private House  [  ] Adult Home  [  ] Skilled Nursing Facility      [ x ] Short Term  [  ] Long Term  [  ] Stairs       Elevator [  ]    FUNCTIONAL STATUS PTA: (Check all that apply)  Ambulation: [   ]Independent    [x  ] Dependent     [  ] Non-Ambulatory  Assistive Device: [  ] SA Cane  [  ]  Q Cane  [  ] Walker  [  ]  Wheelchair  ADL : [  ] Independent  [ x ]  Dependent       Vital Signs Last 24 Hrs  T(C): 36.9 (2020 07:45), Max: 36.9 (2020 07:45)  T(F): 98.4 (2020 07:45), Max: 98.4 (2020 07:45)  HR: 103 (2020 13:43) (74 - 103)  BP: 99/60 (2020 13:43) (99/60 - 132/75)  BP(mean): --  RR: 19 (2020 13:43) (18 - 20)  SpO2: 97% (2020 13:43) (97% - 99%)      PHYSICAL EXAM: confused  GENERAL: NAD  HEAD:  Atraumatic, Normocephalic  CHEST/LUNG: Clear   HEART: S1S2+  ABDOMEN: Soft, Nontender  EXTREMITIES:  no calf tenderness    NERVOUS SYSTEM:  Cranial Nerves 2-12 intact [  ] Abnormal  [  ]  ROM: WFL all extremities [  ]  Abnormal [ x ]able to move all ext  Motor Strength: WFL all extremities  [  ]  Abnormal [x  ]  Sensation: intact to light touch [  ] Abnormal [  ]  Reflexes: Symmetric [  ]  Abnormal [  ]    FUNCTIONAL STATUS:  Bed Mobility: Independent [  ]  Supervision [  ]  Needs Assistance [ x ]  N/A [  ]  Transfers: Independent [  ]  Supervision [  ]  Needs Assistance [ x ]  N/A [  ]   Ambulation: Independent [  ]  Supervision [  ]  Needs Assistance [  ]  N/A [  ]  ADL: Independent [  ] Requires Assistance [  ] N/A [  ]      LABS:                        13.4   13.83 )-----------( 63       ( 2020 11:26 )             41.0     01-13    143  |  113<H>  |  64<HH>  ----------------------------<  121<H>  4.7   |  14<L>  |  1.3    Ca    8.3<L>      2020 11:26  Mg     2.2     01-13    TPro  5.0<L>  /  Alb  2.9<L>  /  TBili  1.3<H>  /  DBili  x   /  AST  49<H>  /  ALT  137<H>  /  AlkPhos  57  -    PT/INR - ( 2020 11:26 )   PT: 27.70 sec;   INR: 2.43 ratio         PTT - ( 2020 11:26 )  PTT:43.7 sec  Urinalysis Basic - ( 2020 15:50 )    Color: Yellow / Appearance: Clear / S.023 / pH: x  Gluc: x / Ketone: Negative  / Bili: Negative / Urobili: <2 mg/dL   Blood: x / Protein: 30 mg/dL / Nitrite: Negative   Leuk Esterase: Negative / RBC: 2 /HPF / WBC 1 /HPF   Sq Epi: x / Non Sq Epi: 2 /HPF / Bacteria: Negative        RADIOLOGY & ADDITIONAL STUDIES:    Assesment:

## 2020-01-14 NOTE — CONSULT NOTE ADULT - ASSESSMENT
IMPRESSION: Rehab of gait dysfunction    PRECAUTIONS: [  ] Cardiac  [  ] Respiratory  [  ] Seizures [  ] Contact Isolation  [  ] Droplet Isolation  [  ] Other    Weight Bearing Status:     RECOMMENDATION:    Out of Bed to Chair     DVT/Decubiti Prophylaxis    REHAB PLAN:     [ x  ] Bedside P/T 3-5 times a week   [   ]   Bedside O/T  2-3 times a week             [   ] No Rehab Therapy Indicated                   [   ]  Speech Therapy   Conditioning/ROM                                    ADL  Bed Mobility                                               Conditioning/ROM  Transfers                                                     Bed Mobility  Sitting /Standing Balance                         Transfers                                        Gait Training                                               Sitting/Standing Balance  Stair Training [   ]Applicable                    Home equipment Eval                                                                        Splinting  [   ] Only      GOALS:   ADL   [   ]   Independent                    Transfers  [   ] Independent                          Ambulation  [   ] Independent     [  x  ] With device                            [   ]  CG                                                         [ x  ]  CG                                                                  [  x ] CG                            [    ] Min A                                                   [   ] Min A                                                              [   ] Min  A          DISCHARGE PLAN:   [   ]  Good candidate for Intensive Rehabilitation/Hospital based                                             Will tolerate 3hrs Intensive Rehab Daily                                       [  x  ]  Short Term Rehab in Skilled Nursing Facility / snf                                       [    ]  Home with Outpatient or VN services                                         [    ]  Possible Candidate for Intensive Hospital based Rehab

## 2020-01-14 NOTE — PROGRESS NOTE ADULT - SUBJECTIVE AND OBJECTIVE BOX
SADE HARRIS 89y Female  MRN#: [id number]       SUBJECTIVE  89 year old with pmh of Afib on Eliquis, diverticulosis, GERD, on Smith placed in [facility], right eye blindness, presents from NH with respiratory distress. Patient was discharged to NH from [facility] after being treated for septic shock secondary to PNA, her previous hospitalization was complicated with suspected cholecystitis managed with cholecystostomy tube, and RITU.   Patient was discharged to NH on Aztreonam, flagyl and zyvox. the last one was never given there due to delay in delivery.   Per the son today, she is at baseline she has mild dementia but she was fully independent before recent illness last week.    OBJECTIVE  PAST MEDICAL & SURGICAL HISTORY  Afib  Cholecystostomy drain infection    ALLERGIES:  penicillins (Other (U))  sulfonamides (Other (U))    MEDICATIONS:  STANDING MEDICATIONS  chlorhexidine 4% Liquid 1 Application(s) Topical <User Schedule>  meropenem  IVPB 1000 milliGRAM(s) IV Intermittent every 12 hours  metoprolol succinate ER 25 milliGRAM(s) Oral daily    PRN MEDICATIONS      VITAL SIGNS: Last 24 Hours  T(C): 36.9 (2020 07:45), Max: 36.9 (2020 07:45)  T(F): 98.4 (2020 07:45), Max: 98.4 (2020 07:45)  HR: 74 (2020 07:45) (74 - 94)  BP: 114/51 (2020 07:45) (106/60 - 132/75)  BP(mean): --  RR: 20 (2020 07:45) (18 - 20)  SpO2: 99% (2020 07:45) (97% - 99%)    LABS:                        13.4   13.83 )-----------( 63       ( 2020 11:26 )             41.0     01-13    143  |  113<H>  |  64<HH>  ----------------------------<  121<H>  4.7   |  14<L>  |  1.3    Ca    8.3<L>      2020 11:26  Mg     2.2     01-13    TPro  5.0<L>  /  Alb  2.9<L>  /  TBili  1.3<H>  /  DBili  x   /  AST  49<H>  /  ALT  137<H>  /  AlkPhos  57      PT/INR - ( 2020 11:26 )   PT: 27.70 sec;   INR: 2.43 ratio         PTT - ( 2020 11:26 )  PTT:34.0 sec  Urinalysis Basic - ( 2020 15:50 )    Color: Yellow / Appearance: Clear / S.023 / pH: x  Gluc: x / Ketone: Negative  / Bili: Negative / Urobili: <2 mg/dL   Blood: x / Protein: 30 mg/dL / Nitrite: Negative   Leuk Esterase: Negative / RBC: 2 /HPF / WBC 1 /HPF   Sq Epi: x / Non Sq Epi: 2 /HPF / Bacteria: Negative      Culture - Urine (collected 2020 15:50)  Source: .Urine Clean Catch (Midstream)  Final Report (2020 20:01):    No growth    Culture - Blood (collected 2020 11:55)  Source: .Blood Blood  Preliminary Report (2020 16:02):    No growth to date.    Culture - Blood (collected 2020 11:50)  Source: .Blood Blood  Preliminary Report (2020 16:02):    No growth to date.      CARDIAC MARKERS ( 2020 00:37 )  x     / 0.06 ng/mL / x     / x     / 4.6 ng/mL      RADIOLOGY:  < from: CT Abdomen and Pelvis w/ IV Cont (20 @ 17:08) >    Right greater than left pleural effusions.    Marked right heart enlargement with reflux contrast into the IVC and hepatic veins, compatible with right heart failure.    Cholecystostomy tube in gallbladder fossa.    There is increased soft tissue density in the presacral space etiology not apparent on this exam however rectal pathology is a consideration.    < end of copied text >    < from: Xray Chest 1 View-PORTABLE IMMEDIATE (20 @ 12:55) >    Impression:      Bilateral lung opacities, predominantly at the lung bases    < end of copied text >      PHYSICAL EXAM:  GENERAL: NAD, AO X1-2, frail.   HEENT:  Atraumatic, Normocephalic, right eye blind  PULMONARY: Bilateral lung fields crackles. Poor inspiratory effort.   CARDIOVASCULAR: Normal S1 and S2  GASTROINTESTINAL: Soft, Nontender, cholecystectomy tube with biliary drainage.   MUSCULOSKELETAL:  2+ Peripheral Pulses, No  edema  NEUROLOGY: non-focal  SKIN: No rashes or lesions      ADMISSION SUMMARY  Patient is a 89y old Female who presents with a chief complaint of respiratory distress (2020 09:04)    ASSESSMENT & PLAN    # Acute hypoxic respiratory failure  -Can be secondary to gram negative pneumonia, Septic on admission.  -Currently hemodynamically stable. ID following. RVP collected.   -Follow up with final cultures. Preliminary negative.   -Continue with meropenem for now.   -HAGMA likely secondary to sepsis and Uremia   -Differential include acute heart failure as well. Follow up with 2d echo. Previous echo  shows reduced EF of 40%. CT shows bilateral pleural effusion. Will give her stat dose of IV lasix 20mg once today. Monitor blood pressure.   -trial of void, and put on primafit if incontinent.   -Daily weights and Monitor input and output.   -likely Type 2 NSTEMI due to hypotension, no chest pain or ischemic changes in the EKG  -Acute thrombocytopenia- Could be secondary to sepsis vs HIT? Will send HIT panel       # History afib on 2.5 mg Eliquis   - hold Eliquis  - c/w metoprolol 25 mg QD     # CKD III  -Monitor BMP    # Cholecystitis s/p cholecystostomy tube placed last week at [facility]  -Follow up outpatient for removal 2-3 weeks.     #DVT prophylaxis  -On compression Sequentials    # DNR/DNI SADE HARRIS 89y Female  MRN#: [id number]       SUBJECTIVE  89 year old with pmh of Afib on Eliquis, diverticulosis, GERD, on Smith placed in [facility], right eye blindness, presents from NH with respiratory distress. Patient was discharged to NH from [facility] after being treated for septic shock secondary to PNA, her previous hospitalization was complicated with suspected cholecystitis managed with cholecystostomy tube, and RITU.   Patient was discharged to NH on Aztreonam, flagyl and zyvox. the last one was never given there due to delay in delivery.   Per the son today, she is at baseline she has mild dementia but she was fully independent before recent illness last week.    OBJECTIVE  PAST MEDICAL & SURGICAL HISTORY  Afib  Cholecystostomy drain infection    ALLERGIES:  penicillins (Other (U))  sulfonamides (Other (U))    MEDICATIONS:  STANDING MEDICATIONS  chlorhexidine 4% Liquid 1 Application(s) Topical <User Schedule>  meropenem  IVPB 1000 milliGRAM(s) IV Intermittent every 12 hours  metoprolol succinate ER 25 milliGRAM(s) Oral daily    PRN MEDICATIONS      VITAL SIGNS: Last 24 Hours  T(C): 36.9 (2020 07:45), Max: 36.9 (2020 07:45)  T(F): 98.4 (2020 07:45), Max: 98.4 (2020 07:45)  HR: 74 (2020 07:45) (74 - 94)  BP: 114/51 (2020 07:45) (106/60 - 132/75)  BP(mean): --  RR: 20 (2020 07:45) (18 - 20)  SpO2: 99% (2020 07:45) (97% - 99%)    LABS:                        13.4   13.83 )-----------( 63       ( 2020 11:26 )             41.0     01-13    143  |  113<H>  |  64<HH>  ----------------------------<  121<H>  4.7   |  14<L>  |  1.3    Ca    8.3<L>      2020 11:26  Mg     2.2     01-13    TPro  5.0<L>  /  Alb  2.9<L>  /  TBili  1.3<H>  /  DBili  x   /  AST  49<H>  /  ALT  137<H>  /  AlkPhos  57      PT/INR - ( 2020 11:26 )   PT: 27.70 sec;   INR: 2.43 ratio         PTT - ( 2020 11:26 )  PTT:34.0 sec  Urinalysis Basic - ( 2020 15:50 )    Color: Yellow / Appearance: Clear / S.023 / pH: x  Gluc: x / Ketone: Negative  / Bili: Negative / Urobili: <2 mg/dL   Blood: x / Protein: 30 mg/dL / Nitrite: Negative   Leuk Esterase: Negative / RBC: 2 /HPF / WBC 1 /HPF   Sq Epi: x / Non Sq Epi: 2 /HPF / Bacteria: Negative      Culture - Urine (collected 2020 15:50)  Source: .Urine Clean Catch (Midstream)  Final Report (2020 20:01):    No growth    Culture - Blood (collected 2020 11:55)  Source: .Blood Blood  Preliminary Report (2020 16:02):    No growth to date.    Culture - Blood (collected 2020 11:50)  Source: .Blood Blood  Preliminary Report (2020 16:02):    No growth to date.      CARDIAC MARKERS ( 2020 00:37 )  x     / 0.06 ng/mL / x     / x     / 4.6 ng/mL      RADIOLOGY:  < from: CT Abdomen and Pelvis w/ IV Cont (20 @ 17:08) >    Right greater than left pleural effusions.    Marked right heart enlargement with reflux contrast into the IVC and hepatic veins, compatible with right heart failure.    Cholecystostomy tube in gallbladder fossa.    There is increased soft tissue density in the presacral space etiology not apparent on this exam however rectal pathology is a consideration.    < end of copied text >    < from: Xray Chest 1 View-PORTABLE IMMEDIATE (20 @ 12:55) >    Impression:      Bilateral lung opacities, predominantly at the lung bases    < end of copied text >      PHYSICAL EXAM:  GENERAL: NAD, AO X1-2, frail.   HEENT:  Atraumatic, Normocephalic, right eye blind  PULMONARY: Bilateral lung fields crackles. Poor inspiratory effort.   CARDIOVASCULAR: Normal S1 and S2  GASTROINTESTINAL: Soft, Nontender, cholecystectomy tube with biliary drainage.   MUSCULOSKELETAL:  2+ Peripheral Pulses, No  edema  NEUROLOGY: non-focal  SKIN: No rashes or lesions, cholecystostomy site clean and not tender.       ADMISSION SUMMARY  Patient is a 89y old Female who presents with a chief complaint of respiratory distress (2020 09:04)    ASSESSMENT & PLAN    # Acute hypoxic respiratory failure  -Can be secondary to gram negative pneumonia, Septic on admission.  -Currently hemodynamically stable. ID following. RVP collected.   -Follow up with final cultures. Preliminary negative.   -Continue with meropenem for now.   -HAGMA likely secondary to sepsis and Uremia   -Differential include acute heart failure as well. Follow up with 2d echo. Previous echo  shows reduced EF of 40%. CT shows bilateral pleural effusion. Will give her stat dose of IV lasix 20mg once today. Monitor blood pressure.   -trial of void, and put on primafit if incontinent.   -Daily weights and Monitor input and output.   -likely Type 2 NSTEMI due to hypotension, no chest pain or ischemic changes in the EKG  -Acute thrombocytopenia- Could be secondary to sepsis vs HIT? Will send HIT panel     # History afib on 2.5 mg Eliquis   - hold Eliquis  - c/w metoprolol 25 mg QD     # CKD III  -Monitor BMP    # Cholecystitis s/p cholecystostomy tube placed last week at [facility]  -Follow up outpatient for removal 2-3 weeks.     #DVT prophylaxis  -On compression Sequentials    # DNR/DNI

## 2020-01-14 NOTE — PROGRESS NOTE ADULT - SUBJECTIVE AND OBJECTIVE BOX
SADE HARRIS  89y, Female  Allergy: penicillins (Other (U))  sulfonamides (Other (U))      CHIEF COMPLAINT: respiratory distress (2020 11:10)      INTERVAL EVENTS/HPI  - No acute events overnight  - T(F): , Max: 98.4 (20 @ 07:45)  - Denies any worsening symptoms  - Tolerating medication  - WBC Count: 13.83 (20 @ 11:26)  WBC Count: 11.66 (20 @ 11:40)  - Creatinine, Serum: 1.3 (20 @ 11:26)  Creatinine, Serum: 1.3 (20 @ 00:37)       ROS  General: Denies rigors, nightsweats  HEENT: Denies headache, rhinorrhea, sore throat, eye pain  CV: Denies CP, palpitations  PULM: Denies wheezing, hemoptysis  GI: Denies hematemesis, hematochezia, melena  : Denies discharge, hematuria  MSK: Denies arthralgias, myalgias  SKIN: Denies rash, lesions  NEURO: Denies paresthesias, weakness  PSYCH: Denies depression, anxiety    VITALS:  T(F): 98.4, Max: 98.4 (20 @ 07:45)  HR: 74  BP: 114/51  RR: 20Vital Signs Last 24 Hrs  T(C): 36.9 (2020 07:45), Max: 36.9 (2020 07:45)  T(F): 98.4 (2020 07:45), Max: 98.4 (2020 07:45)  HR: 74 (2020 07:45) (74 - 94)  BP: 114/51 (2020 07:45) (106/60 - 132/75)  BP(mean): --  RR: 20 (2020 07:45) (18 - 20)  SpO2: 99% (2020 07:45) (97% - 99%)    PHYSICAL EXAM:  Gen: NAD, resting in bed  HEENT: Normocephalic, atraumatic  Neck: supple, no lymphadenopathy  CV: Regular rate & regular rhythm  Lungs: decreased BS at bases, no fremitus  Abdomen: Soft, BS present, Cholecystotomy tube with dark bilious fluid  Ext: Warm, well perfused  Neuro: non focal, awake  Skin: no rash, no erythema  Lines: no phlebitis    FH: Non-contributory  Social Hx: Non-contributory    TESTS & MEASUREMENTS:                        13.4   13.83 )-----------( 63       ( 2020 11:26 )             41.0         143  |  113<H>  |  64<HH>  ----------------------------<  121<H>  4.7   |  14<L>  |  1.3    Ca    8.3<L>      2020 11:26  Mg     2.2         TPro  5.0<L>  /  Alb  2.9<L>  /  TBili  1.3<H>  /  DBili  x   /  AST  49<H>  /  ALT  137<H>  /  AlkPhos  57      eGFR if Non African American: 36 mL/min/1.73M2 (20 @ 11:26)  eGFR if : 42 mL/min/1.73M2 (20 @ 11:26)    LIVER FUNCTIONS - ( 2020 11:26 )  Alb: 2.9 g/dL / Pro: 5.0 g/dL / ALK PHOS: 57 U/L / ALT: 137 U/L / AST: 49 U/L / GGT: x           Urinalysis Basic - ( 2020 15:50 )    Color: Yellow / Appearance: Clear / S.023 / pH: x  Gluc: x / Ketone: Negative  / Bili: Negative / Urobili: <2 mg/dL   Blood: x / Protein: 30 mg/dL / Nitrite: Negative   Leuk Esterase: Negative / RBC: 2 /HPF / WBC 1 /HPF   Sq Epi: x / Non Sq Epi: 2 /HPF / Bacteria: Negative        Culture - Urine (collected 20 @ 15:50)  Source: .Urine Clean Catch (Midstream)  Final Report (20 @ 20:01):    No growth    Culture - Blood (collected 20 @ 11:55)  Source: .Blood Blood  Preliminary Report (20 @ 16:02):    No growth to date.    Culture - Blood (collected 20 @ 11:50)  Source: .Blood Blood  Preliminary Report (20 @ 16:02):    No growth to date.        Lactate, Blood: 2.8 mmol/L (20 @ 11:26)  Lactate, Blood: 2.5 mmol/L (20 @ 00:37)  Blood Gas Venous - Lactate: 3.9 mmoL/L (20 @ 13:17)  Lactate, Blood: 3.7 mmol/L (20 @ 11:40)  Blood Gas Venous - Lactate: 3.5 mmoL/L (20 @ 15:20)      INFECTIOUS DISEASES TESTING  MRSA PCR Result.: Negative (20 @ 00:15)      RADIOLOGY & ADDITIONAL TESTS:  I have personally reviewed the last Chest xray  CXR      CT  CT Abdomen and Pelvis w/ IV Cont:   EXAM:  CT ABDOMEN AND PELVIS IC            PROCEDURE DATE:  2020            INTERPRETATION:  CLINICAL STATEMENT: Sepsis.      TECHNIQUE: Contiguous axial CT images were obtained from the lower chest to the pubic symphysis following administration of 100cc Optiray 320 intravenous contrast.  Oral contrast was not administered.  Reformatted images in the coronal and sagittal planes were acquired.    COMPARISON: 2012      FINDINGS:    LOWER CHEST: Partially imaged right greater than leftpleural effusions with adjacent atelectasis. Marked right heart enlargement with reflux of contrast into the IVC and hepatic veins.    HEPATOBILIARY: Cholecystostomy tube seen in the gallbladder fossa, possibly within a collapsed gallbladder.    SPLEEN: Unremarkable.    PANCREAS: Unremarkable.    ADRENAL GLANDS: Unremarkable.    KIDNEYS: No hydronephrosis. Left interpolar 2.7 cm fat-containing lesion, possibly angiomyolipoma    ABDOMINOPELVIC NODES: Unremarkable.    PELVIC ORGANS: Urinary bladdercatheter collapsed around a Smith catheter. There is increased soft tissue density in the presacral space etiology not apparent on this exam however rectal pathology is a consideration.    PERITONEUM/MESENTERY/BOWEL: No evidence for bowel obstruction, ascites, or pneumoperitoneum.     BONES/SOFT TISSUES: Presacral edema. Anasarca. Osteopenia with degenerative changes of the spine.    OTHER: Diffuse vascular calcifications.      IMPRESSION:     Right greater than left pleural effusions.    Marked right heart enlargement with reflux contrast into the IVC and hepatic veins, compatible with right heart failure.    Cholecystostomy tube in gallbladder fossa.    There is increased soft tissue density in the presacral space etiology not apparent on this exam however rectal pathology is a consideration.              MIRIAM CARABALLO M.D., RESIDENT RADIOLOGIST  This document has been electronically signed.  AMADOR HAMILTON M.D., ATTENDING RADIOLOGIST  This document has been electronically signed. 2020  6:06PM             (20 @ 17:08)      CARDIOLOGY TESTING      MEDICATIONS  chlorhexidine 4% Liquid 1  meropenem  IVPB 1000  metoprolol succinate ER 25      ANTIBIOTICS:  meropenem  IVPB 1000 milliGRAM(s) IV Intermittent every 12 hours      All available historical records have been reviewed

## 2020-01-15 LAB
GLUCOSE BLDC GLUCOMTR-MCNC: 118 MG/DL — HIGH (ref 70–99)
HEPARIN-PF4 AB RESULT: <0.6 U/ML — SIGNIFICANT CHANGE UP (ref 0–0.9)
PF4 HEPARIN CMPLX AB SER-ACNC: NEGATIVE — SIGNIFICANT CHANGE UP
RAPID RVP RESULT: SIGNIFICANT CHANGE UP

## 2020-01-15 PROCEDURE — 93306 TTE W/DOPPLER COMPLETE: CPT | Mod: 26

## 2020-01-15 PROCEDURE — 99233 SBSQ HOSP IP/OBS HIGH 50: CPT

## 2020-01-15 RX ORDER — ENOXAPARIN SODIUM 100 MG/ML
40 INJECTION SUBCUTANEOUS AT BEDTIME
Refills: 0 | Status: DISCONTINUED | OUTPATIENT
Start: 2020-01-15 | End: 2020-01-20

## 2020-01-15 RX ADMIN — MEROPENEM 100 MILLIGRAM(S): 1 INJECTION INTRAVENOUS at 01:03

## 2020-01-15 RX ADMIN — CHLORHEXIDINE GLUCONATE 1 APPLICATION(S): 213 SOLUTION TOPICAL at 06:33

## 2020-01-15 RX ADMIN — Medication 25 MILLIGRAM(S): at 06:33

## 2020-01-15 RX ADMIN — MEROPENEM 100 MILLIGRAM(S): 1 INJECTION INTRAVENOUS at 12:46

## 2020-01-15 RX ADMIN — ENOXAPARIN SODIUM 40 MILLIGRAM(S): 100 INJECTION SUBCUTANEOUS at 21:11

## 2020-01-15 NOTE — DIETITIAN INITIAL EVALUATION ADULT. - PHYSICAL APPEARANCE
overweight/BMI 29.9; unsure accuracy of BMI as ht may be underestimated by pt son and she appears well nourished + possibly emaciated/other (specify) Ht is 60" per son

## 2020-01-15 NOTE — DIETITIAN INITIAL EVALUATION ADULT. - ADD RECOMMEND
add mechanical soft modifier to current diet order, add ensure enlive supplement TID to promote po intake, encourage po intake, provide assistance with meals PRN

## 2020-01-15 NOTE — PROGRESS NOTE ADULT - SUBJECTIVE AND OBJECTIVE BOX
LENGTH OF HOSPITAL STAY: 3d    CHIEF COMPLAINT:   Patient is a 89y old  Female who presents with a chief complaint of respiratory distress (15 Candido 2020 11:25)      Overnight events:    No acute events overnight, mildly aorusable    ALLERGIES:  penicillins (Other (U))  sulfonamides (Other (U))    MEDICATIONS:  STANDING MEDICATIONS  chlorhexidine 4% Liquid 1 Application(s) Topical <User Schedule>  meropenem  IVPB 1000 milliGRAM(s) IV Intermittent every 12 hours  metoprolol succinate ER 25 milliGRAM(s) Oral daily    PRN MEDICATIONS    VITALS:   T(F): 96.4  HR: 97  BP: 113/63  RR: 18  SpO2: 98%    LABS:          PTT - ( 14 Jan 2020 11:26 )  PTT:43.7 sec          Culture - Blood (collected 13 Jan 2020 11:26)  Source: .Blood None  Preliminary Report (14 Jan 2020 23:02):    No growth to date.    Culture - Urine (collected 12 Jan 2020 15:50)  Source: .Urine Clean Catch (Midstream)  Final Report (13 Jan 2020 20:01):    No growth            Cultures:    Culture - Blood (collected 13 Jan 2020 11:26)  Source: .Blood None  Preliminary Report (14 Jan 2020 23:02):    No growth to date.    Culture - Urine (collected 12 Jan 2020 15:50)  Source: .Urine Clean Catch (Midstream)  Final Report (13 Jan 2020 20:01):    No growth        RADIOLOGY:    PHYSICAL EXAM:  GEN: Obtunded, on 2 L NC  HEENT: SELINA  LUNGS:  b/l basal crackles   HEART: S1/S2 present. RRR.   ABD: Soft, non-tender, non-distended. Bowel sounds present  EXT: LE pitting edema  NEURO: AAOX1-0 LENGTH OF HOSPITAL STAY: 3d    CHIEF COMPLAINT:   Patient is a 89y old  Female who presents with a chief complaint of respiratory distress (15 Candido 2020 11:25)      Overnight events: More lethargic today     No acute events overnight, mildly aorusable    ALLERGIES:  penicillins (Other (U))  sulfonamides (Other (U))    MEDICATIONS:  STANDING MEDICATIONS  chlorhexidine 4% Liquid 1 Application(s) Topical <User Schedule>  meropenem  IVPB 1000 milliGRAM(s) IV Intermittent every 12 hours  metoprolol succinate ER 25 milliGRAM(s) Oral daily    PRN MEDICATIONS    VITALS:   T(F): 96.4  HR: 97  BP: 113/63  RR: 18  SpO2: 98%    LABS:          RADIOLOGY:    PHYSICAL EXAM:  GEN: Obtunded, on 2 L NC  HEENT: SELINA  LUNGS:  b/l basal crackles   HEART: S1/S2 present. RRR.   ABD: Soft, non-tender, non-distended. Bowel sounds present  EXT: LE pitting edema  NEURO: AAOX1-0

## 2020-01-15 NOTE — DIETITIAN INITIAL EVALUATION ADULT. - OTHER INFO
Pt admitted d/t sepsis, RITU, and fever. Pt with cystitis is sp cholecystostomy placement ~1 week ago @ [facility]. No edema noted. Skin assessment on 1/15 shows ecchymosis. Pt noted to be a poor historian. Pt asleep in bed at time of RD assessment and arouses when her name is shouted, but she does not participate in interview and falls back asleep. Spoke with pt son, Cas, over the phone to obtain nutrition hx. Pt son reports that pt typically has a good appetite and eats well pta, however over the last 10 days, she has not been eating much- if anything at all. Pt son reports that she has trouble chewing foods that are very hard, though she does not wear dentures. He believes that she may do better with mechanical soft diet. He also reports that pt may be willing to consume ensure enlive supplements as she seems to be doing well with the fluids on her trays. Pt had NKFA/intolerances. No vitamins/supplements pta. No food preferences r/t culture/[employer]. Pt son endorses wt loss over the last few days. He is unsure of her UBW and thinks she could be down 125 lbs at this time, however he states that he is not sure of the accuracy of this wt hx. Son reports that dosing wt of 154 lbs is probably her wt prior to wt loss. Daily wt from 1/13 is 153 lbs. It is unlikely that pt had ~30 lbs wt loss over the course of 10 days. There are no previous admissions to compare wt trends. Pt does not cooperate when RD attempts to perform NFPE, she turns over in bed upon attempt. Unable to determine is pt meets PCM criteria as RD is unable to obtain enough information at this time. Fecal incontinence noted, unsure of when pt last had a BM as none are recorded per EMR.

## 2020-01-15 NOTE — PROGRESS NOTE ADULT - SUBJECTIVE AND OBJECTIVE BOX
SADE HARRIS  89y, Female  Allergy: penicillins (Other (U))  sulfonamides (Other (U))      CHIEF COMPLAINT: respiratory distress (14 Jan 2020 15:42)      INTERVAL EVENTS/HPI  - No acute events overnight  - T(F): , Max: 96.4 (01-15-20 @ 07:58)  - Denies any worsening symptoms  - Tolerating medication  - WBC Count: 13.83 (01-13-20 @ 11:26)  WBC Count: 11.66 (01-12-20 @ 11:40)  - Creatinine, Serum: 1.3 (01-13-20 @ 11:26)       ROS  General: Denies rigors, nightsweats  HEENT: Denies headache, rhinorrhea, sore throat, eye pain  CV: Denies CP, palpitations  PULM: Denies wheezing, hemoptysis  GI: Denies hematemesis, hematochezia, melena  : Denies discharge, hematuria  MSK: Denies arthralgias, myalgias  SKIN: Denies rash, lesions  NEURO: Denies paresthesias, weakness  PSYCH: Denies depression, anxiety    VITALS:  T(F): 96.4, Max: 96.4 (01-15-20 @ 07:58)  HR: 97  BP: 113/63  RR: 18Vital Signs Last 24 Hrs  T(C): 35.8 (15 Candido 2020 07:58), Max: 35.8 (15 Candido 2020 07:58)  T(F): 96.4 (15 Candido 2020 07:58), Max: 96.4 (15 Candido 2020 07:58)  HR: 97 (15 Candido 2020 07:58) (60 - 107)  BP: 113/63 (15 Candido 2020 07:58) (90/65 - 113/63)  BP(mean): 80 (15 Candido 2020 07:58) (77 - 80)  RR: 18 (15 Candido 2020 07:58) (18 - 19)  SpO2: 98% (14 Jan 2020 23:00) (96% - 98%)    PHYSICAL EXAM:  Gen: NAD, resting in bed  HEENT: Normocephalic, atraumatic  Neck: supple, no lymphadenopathy  CV: Regular rate & regular rhythm  Lungs: decreased BS at bases, no fremitus  Abdomen: Soft, BS present, Cholecystotomy tube with dark bilious fluid  Ext: Warm, well perfused  Neuro: non focal, awake  Skin: no rash, no erythema  Lines: no phlebitis      FH: Non-contributory  Social Hx: Non-contributory    TESTS & MEASUREMENTS:                        13.4   13.83 )-----------( 63       ( 13 Jan 2020 11:26 )             41.0     01-13    143  |  113<H>  |  64<HH>  ----------------------------<  121<H>  4.7   |  14<L>  |  1.3    Ca    8.3<L>      13 Jan 2020 11:26  Mg     2.2     01-13    TPro  5.0<L>  /  Alb  2.9<L>  /  TBili  1.3<H>  /  DBili  x   /  AST  49<H>  /  ALT  137<H>  /  AlkPhos  57  01-13      LIVER FUNCTIONS - ( 13 Jan 2020 11:26 )  Alb: 2.9 g/dL / Pro: 5.0 g/dL / ALK PHOS: 57 U/L / ALT: 137 U/L / AST: 49 U/L / GGT: x               Culture - Blood (collected 01-13-20 @ 11:26)  Source: .Blood None  Preliminary Report (01-14-20 @ 23:02):    No growth to date.    Culture - Urine (collected 01-12-20 @ 15:50)  Source: .Urine Clean Catch (Midstream)  Final Report (01-13-20 @ 20:01):    No growth    Culture - Blood (collected 01-12-20 @ 11:55)  Source: .Blood Blood  Preliminary Report (01-13-20 @ 16:02):    No growth to date.    Culture - Blood (collected 01-12-20 @ 11:50)  Source: .Blood Blood  Preliminary Report (01-13-20 @ 16:02):    No growth to date.        Lactate, Blood: 2.8 mmol/L (01-13-20 @ 11:26)  Lactate, Blood: 2.5 mmol/L (01-13-20 @ 00:37)  Blood Gas Venous - Lactate: 3.9 mmoL/L (01-12-20 @ 13:17)  Lactate, Blood: 3.7 mmol/L (01-12-20 @ 11:40)  Blood Gas Venous - Lactate: 3.5 mmoL/L (01-11-20 @ 15:20)      INFECTIOUS DISEASES TESTING  Rapid RVP Result: NotDetec (01-14-20 @ 09:29)  Rapid RVP Result: NotDetec (01-14-20 @ 07:03)  Legionella Antigen, Urine: Negative (01-13-20 @ 00:15)  MRSA PCR Result.: Negative (01-13-20 @ 00:15)      RADIOLOGY & ADDITIONAL TESTS:  I have personally reviewed the last Chest xray  CXR      CT  CT Abdomen and Pelvis w/ IV Cont:   EXAM:  CT ABDOMEN AND PELVIS IC            PROCEDURE DATE:  01/12/2020            INTERPRETATION:  CLINICAL STATEMENT: Sepsis.      TECHNIQUE: Contiguous axial CT images were obtained from the lower chest to the pubic symphysis following administration of 100cc Optiray 320 intravenous contrast.  Oral contrast was not administered.  Reformatted images in the coronal and sagittal planes were acquired.    COMPARISON: 5/17/2012      FINDINGS:    LOWER CHEST: Partially imaged right greater than leftpleural effusions with adjacent atelectasis. Marked right heart enlargement with reflux of contrast into the IVC and hepatic veins.    HEPATOBILIARY: Cholecystostomy tube seen in the gallbladder fossa, possibly within a collapsed gallbladder.    SPLEEN: Unremarkable.    PANCREAS: Unremarkable.    ADRENAL GLANDS: Unremarkable.    KIDNEYS: No hydronephrosis. Left interpolar 2.7 cm fat-containing lesion, possibly angiomyolipoma    ABDOMINOPELVIC NODES: Unremarkable.    PELVIC ORGANS: Urinary bladdercatheter collapsed around a Smith catheter. There is increased soft tissue density in the presacral space etiology not apparent on this exam however rectal pathology is a consideration.    PERITONEUM/MESENTERY/BOWEL: No evidence for bowel obstruction, ascites, or pneumoperitoneum.     BONES/SOFT TISSUES: Presacral edema. Anasarca. Osteopenia with degenerative changes of the spine.    OTHER: Diffuse vascular calcifications.      IMPRESSION:     Right greater than left pleural effusions.    Marked right heart enlargement with reflux contrast into the IVC and hepatic veins, compatible with right heart failure.    Cholecystostomy tube in gallbladder fossa.    There is increased soft tissue density in the presacral space etiology not apparent on this exam however rectal pathology is a consideration.              MIRIAM CARABALLO M.D., RESIDENT RADIOLOGIST  This document has been electronically signed.  AMADOR HAMILTON M.D., ATTENDING RADIOLOGIST  This document has been electronically signed. Jan 12 2020  6:06PM             (01-12-20 @ 17:08)      CARDIOLOGY TESTING      MEDICATIONS  chlorhexidine 4% Liquid 1  meropenem  IVPB 1000  metoprolol succinate ER 25      ANTIBIOTICS:  meropenem  IVPB 1000 milliGRAM(s) IV Intermittent every 12 hours      All available historical records have been reviewed

## 2020-01-15 NOTE — PROGRESS NOTE ADULT - ASSESSMENT
ASSESSMENT  89yFemale with a past medical history of paroxysmal afib on Eliquis, chronic diastolic CHF, mitral regurgitation, diverticulosis, chronic Smith, right eye blindness who presents from [facility] via EMS with respiratory distress x2 days. Patient admitted for acute hypoxic respiratory failure, with sepsis on admission.    IMPRESSION  #Severe sepsis on admission (T>101F, Pulse>90,  (+) lactic acidosis, metabolic encephalopathy, RITU     CXR demonstrates bilateral basilar lung opacities, suspected GNR PNA    MRSA PCR neg, RVP neg    Blood & Urine cxs NGTD     No evidence of worsening intraabdominal infection  #Right greater than left pleural effusions  #increased soft tissue density in the presacral space etiology not apparent on this exam however rectal pathology is a consideration.    RECOMMENDATIONS  -Obtain micro records from [facility]  -Continue Meropenem (as this occurred while on zosyn)  -Consider thoracentesis   - TTE

## 2020-01-15 NOTE — DIETITIAN INITIAL EVALUATION ADULT. - RD TO REMAIN AVAILABLE
yes/INTERVENTION: meals and snacks, medical food supplements, coordination of care. ME: RD to monitor diet order, energy intake, body composition, NFPF

## 2020-01-15 NOTE — PROGRESS NOTE ADULT - ASSESSMENT
89 year old with pmhx of Afib on Eliquis, diverticulosis, GERD, HFrEF, chronic Smith right eye blindness, presents from NH with respiratory distress for 2 days duration. admitted for Acute hypoxic resp failure.     # Acute hypoxic respiratory failure - Septic on admission.   -CT < from: CT Abdomen and Pelvis w/ IV Cont (01.12.20 @ 17:08) > Right greater than left pleural effusions.  Can be secondary to gram negative pneumonia,   -Currently hemodynamically stable. ID following. RVP collected.   -Follow up with final cultures. Preliminary negative.   -Continue with meropenem for now.  -Consider thora    # Metabolic Encephalopathy  - Most liekly from sepsis, pneumonia  - Speech and swallow eval    # HAGMA likely secondary to sepsis and Uremia   -Differential include acute heart failure as well. Follow up with 2d echo. Previous echo 2016 shows reduced EF of 40%. CT shows bilateral pleural effusion. Will give her stat dose of IV lasix 20mg once today. Monitor blood pressure.   -trial of void, and put on primafit if incontinent.   -Daily weights and Monitor input and output.   -likely Type 2 NSTEMI due to hypotension, no chest pain or ischemic changes in the EKG    # Acute thrombocytopenia- Could be secondary to sepsis vs HIT? Will send HIT panel     # History afib on 2.5 mg Eliquis   - hold Eliquis  - c/w metoprolol 25 mg QD     # Cholecystitis s/p cholecystostomy tube placed last week at [facility]  -Follow up outpatient for removal 2-3 weeks.     # RITU on CKD III  -Cr downtrending  -Monitor BMP    #DVT prophylaxis  -On compression Sequentials    # DNR/DNI  # DVT: lovenox 89 year old with pmhx of Afib on Eliquis, diverticulosis, GERD, HFrEF, chronic Smith right eye blindness, presents from NH with respiratory distress for 2 days duration. admitted for Acute hypoxic resp failure.     # Acute hypoxic respiratory failure - Septic on admission.   -CT < from: CT Abdomen and Pelvis w/ IV Cont (01.12.20 @ 17:08) > Right greater than left pleural effusions.  Can be secondary to gram negative pneumonia,   -Currently hemodynamically stable. ID following. RVP collected.   -Follow up with final cultures. Preliminary negative.   -Continue with meropenem for now.  -Consider thora    # Metabolic Encephalopathy  - Most liekly from sepsis, pneumonia  - Speech and swallow eval    # HAGMA likely secondary to sepsis and Uremia   -Differential include acute heart failure as well. Follow up with 2d echo. Previous echo 2016 shows reduced EF of 40%. CT shows bilateral pleural effusion. Will give her stat dose of IV lasix 20mg once today. Monitor blood pressure.   -trial of void, and put on primafit if incontinent.   -Daily weights and Monitor input and output.   -likely Type 2 NSTEMI due to hypotension, no chest pain or ischemic changes in the EKG    # Acute thrombocytopenia- Could be secondary to sepsis vs HIT? Will send HIT panel     # History afib on 2.5 mg Eliquis   - hold Eliquis  - c/w metoprolol 25 mg QD     # Cholecystitis s/p cholecystostomy tube placed last week at [facility]  -Follow up outpatient for removal 2-3 weeks.     # RITU on CKD III  -Cr downtrending  -Monitor BMP    #DVT prophylaxis  -On compression Sequentials    PLAN: f/u mental status, respiration, wbc, fever, lactate, Echo, Heparin Ab for HIT, Cr/BUN, speech and swallow, plan thoracentesis,    # DNR/DNI  # DVT: lovenox   # activity: as tolerated  # Diet: DASH - s/s eval 89 year old with pmhx of Afib on Eliquis, diverticulosis, GERD, HFrEF, chronic Smith right eye blindness, presents from NH with respiratory distress for 2 days duration. admitted for Acute hypoxic resp failure.     # Acute hypoxic respiratory failure - Septic on admission.   -CT < from: CT Abdomen and Pelvis w/ IV Cont (01.12.20 @ 17:08) > Right greater than left pleural effusions.  Can be secondary to gram negative pneumonia,   -Currently hemodynamically stable. ID following. RVP collected.   -Follow up with final cultures. Preliminary negative.   -Continue with meropenem for now.  -Consider thora    # Metabolic Encephalopathy  - Most liekly from sepsis, pneumonia  - Speech and swallow eval    # HAGMA likely secondary to sepsis and Uremia   -Differential include acute heart failure as well. Follow up with 2d echo. Previous echo 2016 shows reduced EF of 40%. CT shows bilateral pleural effusion. Will give her stat dose of IV lasix 20mg once today. Monitor blood pressure.   -trial of void, and put on primafit if incontinent.   -Daily weights and Monitor input and output.   -likely Type 2 NSTEMI due to hypotension, no chest pain or ischemic changes in the EKG    # Acute thrombocytopenia- Could be secondary to sepsis vs HIT? Will send HIT panel     # History afib on 2.5 mg Eliquis   - hold Eliquis  - c/w metoprolol 25 mg QD     # Cholecystitis s/p cholecystostomy tube placed last week at [facility]  -Follow up outpatient for removal 2-3 weeks.     # RITU on CKD III  -Cr downtrending  -Monitor BMP    #DVT prophylaxis  -On compression Sequentials    PLAN: f/u mental status, respiration/o2, wbc, fever, lactate, Echo, Heparin Ab for HIT, Cr/BUN, daily weight, Is and Os, speech and swallow, plan thoracentesis,    # DNR/DNI  # DVT: lovenox   # activity: as tolerated  # Diet: DASH - s/s eval

## 2020-01-16 LAB
ALBUMIN SERPL ELPH-MCNC: 3.2 G/DL — LOW (ref 3.5–5.2)
ALP SERPL-CCNC: 66 U/L — SIGNIFICANT CHANGE UP (ref 30–115)
ALT FLD-CCNC: 89 U/L — HIGH (ref 0–41)
ANION GAP SERPL CALC-SCNC: 14 MMOL/L — SIGNIFICANT CHANGE UP (ref 7–14)
ANION GAP SERPL CALC-SCNC: 15 MMOL/L — HIGH (ref 7–14)
ANION GAP SERPL CALC-SCNC: 15 MMOL/L — HIGH (ref 7–14)
APTT BLD: 31 SEC — SIGNIFICANT CHANGE UP (ref 27–39.2)
AST SERPL-CCNC: 45 U/L — HIGH (ref 0–41)
BASOPHILS # BLD AUTO: 0.02 K/UL — SIGNIFICANT CHANGE UP (ref 0–0.2)
BASOPHILS NFR BLD AUTO: 0.1 % — SIGNIFICANT CHANGE UP (ref 0–1)
BILIRUB SERPL-MCNC: 1.4 MG/DL — HIGH (ref 0.2–1.2)
BUN SERPL-MCNC: 48 MG/DL — HIGH (ref 10–20)
BUN SERPL-MCNC: 48 MG/DL — HIGH (ref 10–20)
BUN SERPL-MCNC: 53 MG/DL — HIGH (ref 10–20)
C DIFF BY PCR RESULT: NEGATIVE — SIGNIFICANT CHANGE UP
C DIFF TOX GENS STL QL NAA+PROBE: SIGNIFICANT CHANGE UP
CALCIUM SERPL-MCNC: 8.6 MG/DL — SIGNIFICANT CHANGE UP (ref 8.5–10.1)
CALCIUM SERPL-MCNC: 8.6 MG/DL — SIGNIFICANT CHANGE UP (ref 8.5–10.1)
CALCIUM SERPL-MCNC: 8.8 MG/DL — SIGNIFICANT CHANGE UP (ref 8.5–10.1)
CHLORIDE SERPL-SCNC: 116 MMOL/L — HIGH (ref 98–110)
CHLORIDE SERPL-SCNC: 117 MMOL/L — HIGH (ref 98–110)
CHLORIDE SERPL-SCNC: 119 MMOL/L — HIGH (ref 98–110)
CO2 SERPL-SCNC: 17 MMOL/L — SIGNIFICANT CHANGE UP (ref 17–32)
CO2 SERPL-SCNC: 22 MMOL/L — SIGNIFICANT CHANGE UP (ref 17–32)
CO2 SERPL-SCNC: 24 MMOL/L — SIGNIFICANT CHANGE UP (ref 17–32)
CREAT SERPL-MCNC: 0.8 MG/DL — SIGNIFICANT CHANGE UP (ref 0.7–1.5)
CREAT SERPL-MCNC: 0.9 MG/DL — SIGNIFICANT CHANGE UP (ref 0.7–1.5)
CREAT SERPL-MCNC: 0.9 MG/DL — SIGNIFICANT CHANGE UP (ref 0.7–1.5)
EOSINOPHIL # BLD AUTO: 0.08 K/UL — SIGNIFICANT CHANGE UP (ref 0–0.7)
EOSINOPHIL NFR BLD AUTO: 0.6 % — SIGNIFICANT CHANGE UP (ref 0–8)
GLUCOSE SERPL-MCNC: 105 MG/DL — HIGH (ref 70–99)
GLUCOSE SERPL-MCNC: 122 MG/DL — HIGH (ref 70–99)
GLUCOSE SERPL-MCNC: 88 MG/DL — SIGNIFICANT CHANGE UP (ref 70–99)
HCT VFR BLD CALC: 38.4 % — SIGNIFICANT CHANGE UP (ref 37–47)
HGB BLD-MCNC: 12.6 G/DL — SIGNIFICANT CHANGE UP (ref 12–16)
IMM GRANULOCYTES NFR BLD AUTO: 0.8 % — HIGH (ref 0.1–0.3)
INR BLD: 1.44 RATIO — HIGH (ref 0.65–1.3)
LACTATE SERPL-SCNC: 2.7 MMOL/L — HIGH (ref 0.7–2)
LYMPHOCYTES # BLD AUTO: 1.37 K/UL — SIGNIFICANT CHANGE UP (ref 1.2–3.4)
LYMPHOCYTES # BLD AUTO: 9.7 % — LOW (ref 20.5–51.1)
MCHC RBC-ENTMCNC: 32.5 PG — HIGH (ref 27–31)
MCHC RBC-ENTMCNC: 32.8 G/DL — SIGNIFICANT CHANGE UP (ref 32–37)
MCV RBC AUTO: 99 FL — SIGNIFICANT CHANGE UP (ref 81–99)
MONOCYTES # BLD AUTO: 0.57 K/UL — SIGNIFICANT CHANGE UP (ref 0.1–0.6)
MONOCYTES NFR BLD AUTO: 4 % — SIGNIFICANT CHANGE UP (ref 1.7–9.3)
NEUTROPHILS # BLD AUTO: 11.99 K/UL — HIGH (ref 1.4–6.5)
NEUTROPHILS NFR BLD AUTO: 84.8 % — HIGH (ref 42.2–75.2)
NRBC # BLD: 0 /100 WBCS — SIGNIFICANT CHANGE UP (ref 0–0)
PLATELET # BLD AUTO: 45 K/UL — LOW (ref 130–400)
POTASSIUM SERPL-MCNC: 3.9 MMOL/L — SIGNIFICANT CHANGE UP (ref 3.5–5)
POTASSIUM SERPL-MCNC: 4.4 MMOL/L — SIGNIFICANT CHANGE UP (ref 3.5–5)
POTASSIUM SERPL-MCNC: 5.1 MMOL/L — HIGH (ref 3.5–5)
POTASSIUM SERPL-SCNC: 3.9 MMOL/L — SIGNIFICANT CHANGE UP (ref 3.5–5)
POTASSIUM SERPL-SCNC: 4.4 MMOL/L — SIGNIFICANT CHANGE UP (ref 3.5–5)
POTASSIUM SERPL-SCNC: 5.1 MMOL/L — HIGH (ref 3.5–5)
PROT SERPL-MCNC: 5.5 G/DL — LOW (ref 6–8)
PROTHROM AB SERPL-ACNC: 16.5 SEC — HIGH (ref 9.95–12.87)
RBC # BLD: 3.88 M/UL — LOW (ref 4.2–5.4)
RBC # FLD: 17.8 % — HIGH (ref 11.5–14.5)
SODIUM SERPL-SCNC: 151 MMOL/L — HIGH (ref 135–146)
SODIUM SERPL-SCNC: 154 MMOL/L — HIGH (ref 135–146)
SODIUM SERPL-SCNC: 154 MMOL/L — HIGH (ref 135–146)
WBC # BLD: 14.15 K/UL — HIGH (ref 4.8–10.8)
WBC # FLD AUTO: 14.15 K/UL — HIGH (ref 4.8–10.8)

## 2020-01-16 PROCEDURE — 99233 SBSQ HOSP IP/OBS HIGH 50: CPT

## 2020-01-16 RX ORDER — MORPHINE SULFATE 50 MG/1
0.5 CAPSULE, EXTENDED RELEASE ORAL EVERY 4 HOURS
Refills: 0 | Status: DISCONTINUED | OUTPATIENT
Start: 2020-01-16 | End: 2020-01-22

## 2020-01-16 RX ADMIN — MORPHINE SULFATE 0.5 MILLIGRAM(S): 50 CAPSULE, EXTENDED RELEASE ORAL at 18:26

## 2020-01-16 RX ADMIN — MEROPENEM 100 MILLIGRAM(S): 1 INJECTION INTRAVENOUS at 00:11

## 2020-01-16 RX ADMIN — CHLORHEXIDINE GLUCONATE 1 APPLICATION(S): 213 SOLUTION TOPICAL at 05:32

## 2020-01-16 RX ADMIN — MORPHINE SULFATE 0.5 MILLIGRAM(S): 50 CAPSULE, EXTENDED RELEASE ORAL at 17:02

## 2020-01-16 RX ADMIN — MEROPENEM 100 MILLIGRAM(S): 1 INJECTION INTRAVENOUS at 12:09

## 2020-01-16 RX ADMIN — Medication 25 MILLIGRAM(S): at 05:34

## 2020-01-16 RX ADMIN — ENOXAPARIN SODIUM 40 MILLIGRAM(S): 100 INJECTION SUBCUTANEOUS at 21:28

## 2020-01-16 NOTE — PROGRESS NOTE ADULT - SUBJECTIVE AND OBJECTIVE BOX
SADE HARRIS  89y, Female  Allergy: penicillins (Other (U))  sulfonamides (Other (U))      CHIEF COMPLAINT: respiratory distress (15 Candido 2020 13:46)      INTERVAL EVENTS/HPI  - No acute events overnight  - T(F): , Max: 97.2 (01-15-20 @ 23:58)  - Denies any worsening symptoms  - Tolerating medication  - WBC Count: 14.15 K/uL (20 @ 05:13)      ROS  General: Denies fevers, chills, nightsweats, weight loss  HEENT: Denies headache, rhinorrhea, sore throat, eye pain  CV: Denies CP, palpitations  PULM: Denies SOB, cough  GI: Denies abdominal pain, diarrhea  : Denies dysuria, hematuria  MSK: Denies arthralgias  SKIN: Denies rash   NEURO: Denies paresthesias, weakness  PSYCH: Denies depression    FH non-contributory   Social Hx non-contributory    VITALS:  T(F): 96.7, Max: 97.2 (01-15-20 @ 23:58)  HR: 89  BP: 114/67  RR: 20Vital Signs Last 24 Hrs  T(C): 35.9 (2020 08:10), Max: 36.2 (15 Candido 2020 23:58)  T(F): 96.7 (2020 08:10), Max: 97.2 (15 Candido 2020 23:58)  HR: 89 (2020 08:10) (54 - 102)  BP: 114/67 (2020 08:10) (101/73 - 149/67)  BP(mean): 84 (2020 08:10) (84 - 93)  RR: 20 (2020 08:10) (18 - 20)  SpO2: --    PHYSICAL EXAM:  Gen: NAD, resting in bed  HEENT: Normocephalic, atraumatic  Neck: supple, no lymphadenopathy  CV: Regular rate & regular rhythm  Lungs: decreased BS at bases, no fremitus  Abdomen: Soft, BS present  Ext: Warm, well perfused  Neuro: non focal, awake  Skin: no rash, no erythema      TESTS & MEASUREMENTS:                        12.6   14.15 )-----------( 45       ( 2020 05:13 )             38.4         154<H>  |  116<H>  |  53<H>  ----------------------------<  122<H>  3.9   |  24  |  0.9    Ca    8.6      2020 05:13      eGFR if Non African American: 56 mL/min/1.73M2 (20 @ 05:13)  eGFR if African American: 65 mL/min/1.73M2 (20 @ 05:13)          Culture - Blood (collected 20 @ 11:26)  Source: .Blood None  Preliminary Report (20 @ 23:02):    No growth to date.    Culture - Urine (collected 20 @ 15:50)  Source: .Urine Clean Catch (Midstream)  Final Report (20 @ 20:01):    No growth    Culture - Blood (collected 20 @ 11:55)  Source: .Blood Blood  Preliminary Report (20 @ 16:02):    No growth to date.    Culture - Blood (collected 20 @ 11:50)  Source: .Blood Blood  Preliminary Report (20 @ 16:02):    No growth to date.        Lactate, Blood: 2.8 mmol/L (20 @ 11:26)  Lactate, Blood: 2.5 mmol/L (20 @ 00:37)  Blood Gas Venous - Lactate: 3.9 mmoL/L (20 @ 13:17)  Lactate, Blood: 3.7 mmol/L (20 @ 11:40)  Blood Gas Venous - Lactate: 3.5 mmoL/L (20 @ 15:20)      INFECTIOUS DISEASES TESTING  Legionella Antigen, Urine: Negative (20 @ 00:15)  MRSA PCR Result.: Negative (20 @ 00:15)      RADIOLOGY & ADDITIONAL TESTS:  I have personally reviewed the last Chest xray  CXR      CT      CARDIOLOGY TESTING  Transthoracic Echocardiogram:    EXAM:  2-D ECHO (TTE) COMPLETE        PROCEDURE DATE:  01/15/2020      INTERPRETATION:  REPORT:    TRANSTHORACIC ECHOCARDIOGRAM REPORT         Patient Name:   SADE HARRIS Accession #: [id number]  Medical Rec #:  [id number]  Height:      63.0 in 160.0 cm  YOB: 1929 Weight:      154.0 lb 69.85 kg  Patient Age:    89 years   BSA:         1.73 m²  Patient Gender: F          BP:          113/63 mmHg       Date of Exam:        1/15/2020 9:51:47 AM  Referring Physician: [id number]  Sonographer:         CARISSA  Reading Physician:   Ander Cavanaugh M.D.    Procedure:   2D Echo/Doppler/Color Doppler Complete.  Indications: I31.9 - Disease of Pericardium, unspecified  Diagnosis:   Disease of pericardium, unspecified - I31.9        Summary:   1. Left ventricular ejection fraction, by visual estimation, is 20 to 25%.   2. Moderately enlarged right ventricle.   3. Severely reduced RV systolic function.   4. Degenerative mitral valve.   5. Mitral annular calcification.   6. Moderate mitral valve regurgitation.   7. Severe tricuspid regurgitation.   8. Aortic valve thickening with decreased leaflet opening.   9. Mild aortic regurgitation.  10. Focal thickening of the non-coronary cusp of the aortic valve. Cannot rule out vegetation.  11. Mild to moderate pulmonic valve regurgitation.  12. Estimated pulmonary artery systolic pressure is 67.8 mmHg assuming a right atrial pressure of 10 mmHg, which is consistent with severe pulmonary hypertension.    PHYSICIAN INTERPRETATION:  Left Ventricle: Left ventricular ejection fraction, by visual estimation, is 20 to 25%.  Right Ventricle: The right ventricular size is moderately enlarged. RV systolic function is severely reduced.  Left Atrium: Severely enlarged left atrium.  Right Atrium: Severely enlarged right atrium.  Mitral Valve: The mitral valve is degenerative in appearance. There is mitral annular calcification. Moderate mitral valve regurgitation is seen.  Tricuspid Valve: Severe tricuspid regurgitation is visualized. Estimated pulmonary artery systolic pressure is 67.8 mmHg assuming a right atrial pressure of 10 mmHg, which is consistent with severe pulmonary hypertension.  Aortic Valve: The aortic valve is trileaflet. Aortic valve thickening with decreased leaflet opening. Mild aortic valve regurgitation is seen. Focal thickening of the non-coronary cusp of the aortic valve. Cannot rule out vegetation.  Pulmonic Valve: Mild to moderate pulmonic valve regurgitation.       2D AND M-MODE MEASUREMENTS (normal ranges within parentheses):  Left                  Normal   Aorta/Left             Normal  Ventricle:                     Atrium:  IVSd (2D):  1.09 cm  (0.7-1.1) AoV Cusp       1.15  (1.5-2.6)  LVPWd (2D): 0.93 cm  (0.7-1.1) Separation:     cm  LVIDd (2D): 5.89 cm  (3.4-5.7) Left Atrium    4.50  (1.9-4.0)  LVIDs (2D): 4.67 cm            (Mmode):        cm  LV FS (2D):  20.7 %   (>25%)   LA Volume      86.6  Relative      0.32    (<0.42)  Index         ml/m²  Wall                           Right  Thickness                      Ventricle:                                 RVd (2D):      2.92 cm    SPECTRAL DOPPLER ANALYSIS:  Aortic Valve:  AoV VMax:    1.42 m/s AoV Area, Vmax: 1.78 cm² Vmax Indx: 1.03 cm²/m²  AoV VTI:     0.22 m   AoV Area, VTI: 1.64 cm² VTI Indx:  0.95 cm²/m²  AoV Pk Grad: 8.1 mmHg  AoV Mn Grad: 4.1 mmHg    LVOT Vmax: 0.82 m/s  LVOT VTI:  0.12 m  LVOT Diam: 1.98 cm    Mitral Valve:  MV VMax:    1.61 m/s  MV Mn Grad: 4.4 mmHg    Tricuspid Valve and PA/RV Systolic Pressure: TR Max Velocity: 3.80 m/s RA Pressure: 10 mmHg RVSP/PASP: 67.8 mmHg    Pulmonic Valve:  PV Max Velocity: 0.82 m/s PV Max P.7 mmHg PV Mean PG:       [id number] Ander Cavanaugh M.D., Electronically signed on 1/15/2020 at 5:55:10 PM              *** Final ***                    ANDER CAVANAUGH MD  This document has been electronically signed. Candido 15 2020  9:51AM             (01-15-20 @ 09:51)      MEDICATIONS  chlorhexidine 4% Liquid 1  enoxaparin Injectable 40  meropenem  IVPB 1000  metoprolol succinate ER 25      ANTIBIOTICS:  meropenem  IVPB 1000 milliGRAM(s) IV Intermittent every 12 hours      All available historical data has been reviewed

## 2020-01-16 NOTE — PROGRESS NOTE ADULT - ASSESSMENT
89yFemale with a past medical history of paroxysmal afib on Eliquis, chronic diastolic CHF, mitral regurgitation, diverticulosis, chronic Smith, right eye blindness who presents from [facility] via EMS with respiratory distress x2 days. Patient admitted for acute hypoxic respiratory failure, with sepsis on admission.    IMPRESSION  #Severe sepsis on admission (T>101F, Pulse>90,  (+) lactic acidosis, metabolic encephalopathy, RITU     CXR demonstrates bilateral basilar lung opacities, suspected GNR PNA    MRSA PCR neg, RVP neg    Blood & Urine cxs NGTD     No evidence of worsening intraabdominal infection  #Right greater than left pleural effusions  - TTE with low EF & focal thickening of the non-coronary cusp of the aortic valve. Cannot rule out vegetation.  #increased soft tissue density in the presacral space etiology not apparent on this exam however rectal pathology is a consideration.  #hypernatremia    RECOMMENDATIONS  -Obtain micro records from [facility]  -Continue Meropenem (as this occurred while on zosyn)  -Consider thoracentesis

## 2020-01-16 NOTE — SWALLOW BEDSIDE ASSESSMENT ADULT - ORAL PHASE
Within functional limits Decreased anterior-posterior movement of the bolus/Delayed oral transit time suspected decreased oral control

## 2020-01-16 NOTE — PROGRESS NOTE ADULT - ASSESSMENT
a/p:  # Acute hypoxic respiratory failure - severe sepsis present on admission.--suspected GNR pneumonia  -ct with bilateral pleural effusion---consider thoracentesis (if son will agree) (pulmonary consult placed)  -daily CXR  -cont iv abx (on merrem)  -bcx negative to date  -TTE with ? thickening of aortic valve cusp however as bcx negative will not get ANA to r/o BE unless clinical change or if bcx comes back +  -monitor vs closely  -appreciate ID Recomm  -obtain [facility] med records (including microbiology records)  -iv access  -monitor wbc and fever curve  -RVP negative    # Metabolic Encephalopathy with underlying dementia >  - acute metab enceph from sepsis/ pneumonia--also complicated course at OSH including  cholcysitits with cholecystostomy tube     #HFrEF- acute on chronic  -repeat cxr today  -ECHO with EF 20-25%, enlarged LF, decreased RV systolic function, mod MVR, severe TVR and severe pulm htn- aortic vlve cusp thickening  -given lasix 20 mg iv 2 days ago---monitor daily weights and i/o  -cardiology consult  -monitor electrolytes---hypernatremia this morning (na 154)   -will check repeat bmp at 4 pm and check ua/ulytes/jyoti  -monitor fluid status---if repeat Na still >150 would give short course of ivf D51/2 NS @ 75 cc/hr (x8 hrs only) and repeat am bmp (patietn with poor po intake over last few days and concern for hypovolemia)    #NSTEMI type 2  -monitor bp and symptoms  -no current cp    #thrombocytopenia  -trend plts---today 45k  -hit ab negative  -monitor cbc     # History afib on 2.5 mg Eliquis   - hold Eliquis  - c/w metoprolol 25 mg QD     # Cholecystitis s/p cholecystostomy tube placed last week at [facility]  -Follow up outpatient for removal in 2-3 weeks  -get [facility] med records    # RITU on CKD III  -Cr downtrending  -Monitor BMP    #DVT/GI ppx  DNR/DNI  purree diet with aspiration precautions---f/u with sp swallow and PT/Rehab    #Progress Note Handoff  Disposition: Unknown at this time________

## 2020-01-16 NOTE — SWALLOW BEDSIDE ASSESSMENT ADULT - SWALLOW EVAL: DIAGNOSIS
+min oral dysphagia for nectar and puree, +moderate pharyngeal dysphagia w/ thin, nectar, puree w/ overt s/s of penetration/aspiration +mild oral dysphagia for thin, nectar and puree, +overt s/s of penetration/aspiration  w/ thin +min overt s/s of penetration/aspiration w/ puree (delayed throat clear) +toleration of nectar w/o overt s/s of penetration/aspiration

## 2020-01-16 NOTE — CONSULT NOTE ADULT - SUBJECTIVE AND OBJECTIVE BOX
Patient is a 89y old  Female who presents with a chief complaint of respiratory distress (16 Jan 2020 12:33)      HPI:  89 year old with pmhx of Afib on Eliquis, diverticulosis, GERD, chronic Smith right eye blindness, presents from NH with respiratory distress for 2 days duration.   Patient was discharged to NH from [facility] after being treated for septic shock secondary to PNA, her previous hospitalization was complicated with cholecystitis managed with cholecystostomy tube, and RITU.   Per family she is lethargic and poorly communicate since she admitted to NH , has been having worsening SOB for the last 2 days, SOB is associated with cough, runny nose, denies chest pain, urinary symptoms, abd pain.   per NH nurse, patient ahd low appetite, poor PO intake, was not participating in rehab. was not complaining of any other symptoms.   was discharged to NH on Aztreonam, flagyl and zyvox. the last one was never given there due to delay in delivery.   per family; at baseline she has mild dementia and she was fully independent before recent illness last week.  in ed 127/114, hr 127, 100% o2 sat on 8 Ls, temp 101.7 lactate 3.8, CXR showed b/l lower lobe opacities.   discharged to NH with a Smith catheter that was renewed in ED.   had ct abd in ED that showed Marked right heart enlargement with reflux contrast into the IVC and hepatic veins, compatible with right heart failure. Cholecystostomy tube in gallbladder fossa. and increased soft tissue density in the presacral space etiology not apparent on this exam however rectal pathology is a consideration. 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  No cardiovascular or pulmonary family history     REVIEW OF SYSTEMS:    CONSTITUTIONAL:   no fever   no chills.  no weight gain   no weight loss    EYES:   no discharge,   no pain  no redness,   no visual changes.    ENT:   Ears: no ear pain and no hearing problems.  Nose: no nasal congestion and no nasal drainage.  Mouth/Throat: no dysphagia,  no hoarseness and no throat pain.  Neck: no lumps, no pain, no stiffness and no swollen glands.     CARDIOVASCULAR:   no chest pain,   no swelling  no palpitaions  no syncope    RESPIRATORY:  + SOB,  no wheezing ,  no respiratory difficulty  no sputum production    GASTROINTESTINAL:   no abdominal pain,   no constipation,   no diarrhea,   no vomiting.    GENITOURINARY:  no dysuria,   no frequency,   no urgency  no hematuria.    MUSCULOSKELETAL:   no back pain,   no musculoskeletal pain,  no weakness.    SKIN:   no jaundice,   no lesions,   no pruritis,   no rashes.    NEURO:   no loss of consciousness,   no gait abnormality,   no headache,   no sensory deficits,   no weakness.    PSYCHIATRIC:   + dementia  no anxiety  no depression    ALLERGIC/IMMUNOLOGIC:   No active allergic or immunologic issues      Allergies    penicillins (Other (U))  sulfonamides (Other (U))    Intolerances          PHYSICAL EXAM  Vital Signs Last 24 Hrs  T(C): 35.9 (16 Jan 2020 08:10), Max: 36.2 (15 Candido 2020 23:58)  T(F): 96.7 (16 Jan 2020 08:10), Max: 97.2 (15 Candido 2020 23:58)  HR: 89 (16 Jan 2020 08:10) (54 - 102)  BP: 114/67 (16 Jan 2020 08:10) (101/73 - 149/67)  BP(mean): 84 (16 Jan 2020 08:10) (84 - 93)  RR: 20 (16 Jan 2020 08:10) (18 - 20)  SpO2: 90% (16 Jan 2020 11:55) (90% - 100%)    CONSTITUTIONAL:   Ill appearing. NAD    ENT:   Airway patent,   Nasal mucosa clear.  Mouth with normal mucosa.   Throat has no vesicles,  no oropharyngeal exudates and uvula is midline.  No thrush    EYES:   Clear bilaterally,   pupils equal,   round and reactive to light.    CARDIAC:   Normal rate,   regular rhythm.    Heart sounds S1, S2.   normal  cardiac impulse  no edema      CAROTID:   normal systolic impulse  no bruits    RESPIRATORY:   decreased breath sounds over right bases  normal chest expansion  not tachypneic,  no use of accessory muscles    GASTROINTESTINAL:  Abdomen soft,   non-tender,   no guarding,   + BS    MUSCULOSKELETAL:   range of motion is not limited,  no muscle or joint tenderness  no clubbing, cyanosis    NEUROLOGICAL:   Alert and oriented x2  no focal deficits in cranial nerve areas  no motor or sensory deficits.  pertinent DTRs normal      LABS:                          12.6   14.15 )-----------( 45       ( 16 Jan 2020 05:13 )             38.4                                               01-16    154<H>  |  116<H>  |  53<H>  ----------------------------<  122<H>  3.9   |  24  |  0.9    Ca    8.6      16 Jan 2020 05:13                                                                                                                                                                                    MEDICATIONS  (STANDING):  chlorhexidine 4% Liquid 1 Application(s) Topical <User Schedule>  enoxaparin Injectable 40 milliGRAM(s) SubCutaneous at bedtime  meropenem  IVPB 1000 milliGRAM(s) IV Intermittent every 12 hours  metoprolol succinate ER 25 milliGRAM(s) Oral daily    MEDICATIONS  (PRN):      X-Rays reviewed:    CXR interpreted by me: Patient is a 89y old  Female who presents with a chief complaint of SOB (16 Jan 2020 12:33)      HPI:  89 year old with pmhx of Afib on Eliquis, diverticulosis, GERD, chronic Smith right eye blindness, presents from NH with respiratory distress for 2 days duration.   Patient was discharged to NH from [facility] after being treated for septic shock secondary to PNA, her previous hospitalization was complicated with cholecystitis managed with cholecystostomy tube, and RITU.   Per family she is lethargic and poorly communicate since she admitted to NH , has been having worsening SOB for the last 2 days, SOB is associated with cough, runny nose, denies chest pain, urinary symptoms, abd pain.   per NH nurse, patient ahd low appetite, poor PO intake, was not participating in rehab. was not complaining of any other symptoms.   was discharged to NH on Aztreonam, flagyl and zyvox. the last one was never given there due to delay in delivery.   per family; at baseline she has mild dementia and she was fully independent before recent illness last week.  in ed 127/114, hr 127, 100% o2 sat on 8 Ls, temp 101.7 lactate 3.8, CXR showed b/l lower lobe opacities.   discharged to NH with a Smith catheter that was renewed in ED.   had ct abd in ED that showed Marked right heart enlargement with reflux contrast into the IVC and hepatic veins, compatible with right heart failure. Cholecystostomy tube in gallbladder fossa. and increased soft tissue density in the presacral space etiology not apparent on this exam however rectal pathology is a consideration. 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  No cardiovascular or pulmonary family history     REVIEW OF SYSTEMS: see hpi    SKIN:   no jaundice,         Allergies    penicillins (Other (U))  sulfonamides (Other (U))    Intolerances          PHYSICAL EXAM  Vital Signs Last 24 Hrs  T(C): 35.9 (16 Jan 2020 08:10), Max: 36.2 (15 Candido 2020 23:58)  T(F): 96.7 (16 Jan 2020 08:10), Max: 97.2 (15 Candido 2020 23:58)  HR: 89 (16 Jan 2020 08:10) (54 - 102)  BP: 114/67 (16 Jan 2020 08:10) (101/73 - 149/67)  BP(mean): 84 (16 Jan 2020 08:10) (84 - 93)  RR: 20 (16 Jan 2020 08:10) (18 - 20)  SpO2: 90% (16 Jan 2020 11:55) (90% - 100%)    CONSTITUTIONAL:   chronically Ill appearing      EYES:   Clear bilaterally,   pupils equal,   round and reactive to light.    CARDIAC:   richar 4/6      RESPIRATORY:   decreased breath sounds over right bases    GASTROINTESTINAL:  Abdomen soft,   non-tender,   no guarding,   + BS  cholecystostomy tube    swelling +  LABS:                          12.6   14.15 )-----------( 45       ( 16 Jan 2020 05:13 )             38.4                                               01-16    154<H>  |  116<H>  |  53<H>  ----------------------------<  122<H>  3.9   |  24  |  0.9    Ca    8.6      16 Jan 2020 05:13                                                                                                                                                                                    MEDICATIONS  (STANDING):  chlorhexidine 4% Liquid 1 Application(s) Topical <User Schedule>  enoxaparin Injectable 40 milliGRAM(s) SubCutaneous at bedtime  meropenem  IVPB 1000 milliGRAM(s) IV Intermittent every 12 hours  metoprolol succinate ER 25 milliGRAM(s) Oral daily    MEDICATIONS  (PRN):      X-Rays reviewed:    CXR interpreted by me:

## 2020-01-16 NOTE — PHYSICAL THERAPY INITIAL EVALUATION ADULT - SPECIFY REASON(S)
1130 am Attempted to see ot for PT initial evaluation however pt was refusing activity. Will f/u once agreeable to therapy.

## 2020-01-16 NOTE — PROGRESS NOTE ADULT - SUBJECTIVE AND OBJECTIVE BOX
Patient is a 89y old  Female who presents with a chief complaint of respiratory distress (16 Jan 2020 10:16)      Interval Events:    CHIEF COMPLAINT:   Patient is a 89y old  Female who presents with a chief complaint of respiratory distress (15 Candido 2020 11:25)      Overnight events: More lethargic today     No acute events overnight, mildly aorusable    ALLERGIES:  penicillins (Other (U))  sulfonamides (Other (U))    MEDICATIONS:  MEDICATIONS  (STANDING):  chlorhexidine 4% Liquid 1 Application(s) Topical <User Schedule>  enoxaparin Injectable 40 milliGRAM(s) SubCutaneous at bedtime  meropenem  IVPB 1000 milliGRAM(s) IV Intermittent every 12 hours  metoprolol succinate ER 25 milliGRAM(s) Oral daily    MEDICATIONS  (PRN):        VITALS:   Vital Signs Last 24 Hrs  T(C): 35.9 (16 Jan 2020 08:10), Max: 36.2 (15 Candido 2020 23:58)  T(F): 96.7 (16 Jan 2020 08:10), Max: 97.2 (15 Candido 2020 23:58)  HR: 89 (16 Jan 2020 08:10) (54 - 102)  BP: 114/67 (16 Jan 2020 08:10) (101/73 - 149/67)  BP(mean): 84 (16 Jan 2020 08:10) (84 - 93)  RR: 20 (16 Jan 2020 08:10) (18 - 20)  SpO2: 90% (16 Jan 2020 11:55) (90% - 100%)    LABS:  01-16    154<H>  |  116<H>  |  53<H>  ----------------------------<  122<H>  3.9   |  24  |  0.9    Ca    8.6      16 Jan 2020 05:13                          12.6   14.15 )-----------( 45       ( 16 Jan 2020 05:13 )             38.4           RADIOLOGY:    PHYSICAL EXAM:  GEN: Obtunded, on 2 L NC  LUNGS:  b/l basal crackles   HEART: S1/S2 present. RRR.   ABD: Soft, non-tender, non-distended. Bowel sounds present  EXT: LE pitting edema  NEURO: AAOX1-0        Assessment and Plan:   · Assessment		  89 year old with pmhx of Afib on Eliquis, diverticulosis, GERD, HFrEF, chronic Smith right eye blindness, presents from NH with respiratory distress for 2 days duration. admitted for Acute hypoxic resp failure.     # Acute hypoxic respiratory failure - Septic on admission.   -CT < from: CT Abdomen and Pelvis w/ IV Cont (01.12.20 @ 17:08) > Right greater than left pleural effusions.  Severe sepsis on admission (T>101F, Pulse>90,  (+) lactic acidosis, metabolic encephalopathy, RITU     CXR demonstrates bilateral basilar lung opacities, suspected GNR PNA    MRSA PCR neg, RVP neg    Blood & Urine cxs NGTD   -Continue with meropenem for now.    Cardiac   History afib on 2.5 mg Eliquis   - hold Eliquis  - c/w metoprolol 25 mg QD   -  ef 20% RV dysfunction-severe-trial of void, and put on primafit if incontinent.   -Daily weights and Monitor input and output.       GI: dysphagia  - Speech and swallow eval-recs dyspha1 w nectar    # Metabolic Encephalopathy  - Most liekly from sepsis, pneumonia    # Acute thrombocytopenia- Could be secondary to likely sepsis. HIT negative, no IVAN sent.    # Cholecystitis s/p cholecystostomy tube placed last week at [facility]  -Follow up outpatient for removal 2-3 weeks.     # RITU on CKD III  -Cr downtrending  NA up trending will add fluids IV  -Monitor BMP    #DVT prophylaxis  -On compression Sequentials    PLAN: f/u BMP and INR at 4 pm today    # DNR/DNI  # DVT: lovenox   # activity: as tolerated        LABS:                        12.6   14.15 )-----------( 45       ( 16 Jan 2020 05:13 )             38.4     01-16    154<H>  |  116<H>  |  53<H>  ----------------------------<  122<H>  3.9   |  24  |  0.9    Ca    8.6      16 Jan 2020 05:13              CAPILLARY BLOOD GLUCOSE    MICROBIOLOGY:     RADIOLOGY:  [ ] Reviewed and interpreted by me

## 2020-01-16 NOTE — CONSULT NOTE ADULT - ATTENDING COMMENTS
patient seen and examined, agree with above, b/l effusion/ CHF, doubt parapneumonic effusion, family declined thora ( which I agree), palliative care eval
I have personally examined the patient and reviewed the documentation above.  Corrections and edits were made wherever needed.

## 2020-01-16 NOTE — SWALLOW BEDSIDE ASSESSMENT ADULT - COMMENTS
+min oral dysphagia w/nectar and puree characterized by delayed oral transit time, +min pharyngeal dysphagia w/ nectar and puree w/ overt s/s of penetration/aspiration. +mild oral dysphagia w/nectar and puree characterized by delayed oral transit time, +min overt s/s of penetration/aspiration w/ puree, +toleration of nectar w/o overt s/s of penetration/aspiration.

## 2020-01-16 NOTE — PROGRESS NOTE ADULT - ASSESSMENT
89 year old with pmhx of Afib on Eliquis, diverticulosis, GERD, HFrEF, chronic Smith right eye blindness, presents from NH with respiratory distress for 2 days duration. admitted for Acute hypoxic resp failure.     Plan as above. D/w attending

## 2020-01-16 NOTE — PROGRESS NOTE ADULT - SUBJECTIVE AND OBJECTIVE BOX
Patient is a 89y old  Female who presents with a chief complaint of respiratory distress (16 Jan 2020 10:16)    HPI:  89 year old with pmhx of Afib on Eliquis, diverticulosis, GERD, chronic Smith right eye blindness, presents from NH with respiratory distress for 2 days duration.   Patient was discharged to NH from [facility] after being treated for septic shock secondary to PNA, her previous hospitalization was complicated with cholecystitis managed with cholecystostomy tube, and RITU.   Per family she is lethargic and poorly communicate since she admitted to NH , has been having worsening SOB for the last 2 days, SOB is associated with cough, runny nose, denies chest pain, urinary symptoms, abd pain.   per NH nurse, patient ahd low appetite, poor PO intake, was not participating in rehab. was not complaining of any other symptoms.   was discharged to NH on Aztreonam, flagyl and zyvox. the last one was never given there due to delay in delivery.   per family; at baseline she has mild dementia and she was fully independent before recent illness last week.  in ed 127/114, hr 127, 100% o2 sat on 8 Ls, temp 101.7 lactate 3.8, CXR showed b/l lower lobe opacities.   discharged to NH with a Smith catheter that was renewed in ED.   had ct abd in ED that showed Marked right heart enlargement with reflux contrast into the IVC and hepatic veins, compatible with right heart failure. Cholecystostomy tube in gallbladder fossa. and increased soft tissue density in the presacral space etiology not apparent on this exam however rectal pathology is a consideration. (12 Jan 2020 20:58)    PAST MEDICAL & SURGICAL HISTORY:  Afib  Cholecystostomy drain infection    patient seen and examined independently on morning rounds for the first time today in Vent unit, chart reviewed.    no overnight events- no iv access this morning but RN was able to obtain pvl- drowsy and lethargic- states she feels bad today    PE:  GEN-NAD, awake and alert but lethargic  PULM-  fair air entry with decreased bs bilateral bases  CVS- +s1/s2 RRR no murmurs  GI- soft NT ND +bs, no rebound, no guarding  EXT- no edema    VS-Last 24 Hrs  T(C): 35.9 (16 Jan 2020 08:10), Max: 36.2 (15 Candido 2020 23:58)  T(F): 96.7 (16 Jan 2020 08:10), Max: 97.2 (15 Candido 2020 23:58)  HR: 89 (16 Jan 2020 08:10) (54 - 102)  BP: 114/67 (16 Jan 2020 08:10) (101/73 - 149/67)  BP(mean): 84 (16 Jan 2020 08:10) (84 - 93)  RR: 20 (16 Jan 2020 08:10) (18 - 20)  SpO2: 90% (16 Jan 2020 11:55) (90% - 100%)                        12.6   14.15 )-----------( 45       ( 16 Jan 2020 05:13 )             38.4     01-16    154<H>  |  116<H>  |  53<H>  ----------------------------<  122<H>  3.9   |  24  |  0.9    Ca    8.6      16 Jan 2020 05:13                MEDICATIONS  (STANDING):  chlorhexidine 4% Liquid 1 Application(s) Topical <User Schedule>  enoxaparin Injectable 40 milliGRAM(s) SubCutaneous at bedtime  meropenem  IVPB 1000 milliGRAM(s) IV Intermittent every 12 hours  metoprolol succinate ER 25 milliGRAM(s) Oral daily

## 2020-01-16 NOTE — SWALLOW BEDSIDE ASSESSMENT ADULT - NS SPL SWALLOW CLINIC TRIAL FT
+oral swallow WFL w/thins, +moderate pharyngeal dysphagia w/thins w/overt s/s of penetration/aspiration Mild oral dysphagia w/ overt s/s of penetration/aspiration w/ thin liquids

## 2020-01-16 NOTE — SWALLOW BEDSIDE ASSESSMENT ADULT - SLP PERTINENT HISTORY OF CURRENT PROBLEM
Pt admitted w/ respiratory distress, sepsis, fever. PMH: Afib, diverticulosis, cholecystomy tube, GERD, right eye blindness.

## 2020-01-16 NOTE — CONSULT NOTE ADULT - ASSESSMENT
IMPRESSION:    Bilateral pleural effusions R>L likely fluid overload  LLL pneumonia likely hospital-acquired pneumonia  HO HFrEF    RECOMMEND:    No thoracentesis at this time (spoke with son and he refused)  Antibx as per ID  Avoid volume overload  volume contraction as tolerated  follow up cultures  HOB >45  OOb to chair  DNR/DNI IMPRESSION:    Bilateral pleural effusions R>L likely fluid overload/ EF 20 TO 25% doubt parapneumonic effusion  HO HFrEF  multiple co morbidities    RECOMMEND:    No thoracentesis at this time (spoke with son and he refused)  Antibx as per ID  Avoid volume overload  volume contraction as tolerated  follow up cultures  HOB >45  OOb to chair  DNR/DNI  palliative care cherie  recall as needed

## 2020-01-16 NOTE — SWALLOW BEDSIDE ASSESSMENT ADULT - PHARYNGEAL PHASE
Delayed cough post oral intake Cough post oral intake/Delayed cough post oral intake minimally w/ puree/Delayed cough post oral intake

## 2020-01-17 LAB
CULTURE RESULTS: SIGNIFICANT CHANGE UP
CULTURE RESULTS: SIGNIFICANT CHANGE UP
SPECIMEN SOURCE: SIGNIFICANT CHANGE UP
SPECIMEN SOURCE: SIGNIFICANT CHANGE UP

## 2020-01-17 PROCEDURE — 99221 1ST HOSP IP/OBS SF/LOW 40: CPT

## 2020-01-17 PROCEDURE — 71045 X-RAY EXAM CHEST 1 VIEW: CPT | Mod: 26

## 2020-01-17 PROCEDURE — 99497 ADVNCD CARE PLAN 30 MIN: CPT | Mod: 25

## 2020-01-17 PROCEDURE — 99233 SBSQ HOSP IP/OBS HIGH 50: CPT

## 2020-01-17 RX ORDER — SODIUM CHLORIDE 9 MG/ML
1000 INJECTION, SOLUTION INTRAVENOUS
Refills: 0 | Status: DISCONTINUED | OUTPATIENT
Start: 2020-01-17 | End: 2020-01-18

## 2020-01-17 RX ADMIN — CHLORHEXIDINE GLUCONATE 1 APPLICATION(S): 213 SOLUTION TOPICAL at 06:01

## 2020-01-17 RX ADMIN — ENOXAPARIN SODIUM 40 MILLIGRAM(S): 100 INJECTION SUBCUTANEOUS at 21:23

## 2020-01-17 RX ADMIN — MEROPENEM 100 MILLIGRAM(S): 1 INJECTION INTRAVENOUS at 00:58

## 2020-01-17 RX ADMIN — SODIUM CHLORIDE 75 MILLILITER(S): 9 INJECTION, SOLUTION INTRAVENOUS at 17:57

## 2020-01-17 RX ADMIN — MEROPENEM 100 MILLIGRAM(S): 1 INJECTION INTRAVENOUS at 17:57

## 2020-01-17 RX ADMIN — Medication 25 MILLIGRAM(S): at 06:02

## 2020-01-17 NOTE — PROGRESS NOTE ADULT - ASSESSMENT
89 year old with pmhx of Afib on Eliquis, diverticulosis, GERD, HFrEF, chronic Smith, right eye blindness, presents from NH with respiratory distress for 2 days duration. admitted for acute hypoxic resp failure.      # Acute hypoxic respiratory failure  - suspected GNR pneumonia  - Bilateral pleural effusion, no thora at this time. Family refusing anyway.  - daily CXR  - Meropenem 1000mg q8  - Cultures NGTD  - TTE with thickening of aortic valve cusp however as bcx negative will not get ANA to r/o BE unless clinical change or if bcx comes back +  - obtain [facility] med records   - RVP negative    # Metabolic Encephalopathy with underlying dementia   - Acute metab enceph from sepsis/ pneumonia--also complicated course at OSH including  cholecystitis with cholecystostomy tube     #HFrEF- acute on chronic  - ECHO with EF 20-25%, enlarged LF, decreased RV systolic function, mod MVR, severe TVR and severe pulm htn- aortic vlve cusp thickening  - monitor electrolytes---hypernatremia  - Check UA/Ulytes/Amy  -  D5 1/2 NS @ 75 cc/hr for 8 hours due to hypernatremia. Repeat BMP at 8PM    # NSTEMI type 2  - monitor bp and symptoms  - no current CP    # Thrombocytopenia  - trend plts---today Xk  - HIT ab negative  - monitor CBC     # History afib on 2.5 mg Eliquis   - hold Eliquis  - c/w metoprolol 25 mg daily     # Cholecystitis s/p cholecystostomy tube placed last week at [facility]  - Follow up outpatient for removal in 2-3 weeks    # RITU on CKD III  - Cr downtrending  - Monitor BMP    DVT/GI ppx  DNR/DNI  purree diet with aspiration precautions---f/u with sp swallow and PT/Rehab

## 2020-01-17 NOTE — PROGRESS NOTE ADULT - SUBJECTIVE AND OBJECTIVE BOX
Patient is a 89y old  Female who presents with a chief complaint of respiratory distress (16 Jan 2020 10:16)    HPI:  89 year old with pmhx of Afib on Eliquis, diverticulosis, GERD, chronic Smith right eye blindness, presents from NH with respiratory distress for 2 days duration.   Patient was discharged to NH from [facility] after being treated for septic shock secondary to PNA, her previous hospitalization was complicated with cholecystitis managed with cholecystostomy tube, and RITU.   Per family she is lethargic and poorly communicate since she admitted to NH , has been having worsening SOB for the last 2 days, SOB is associated with cough, runny nose, denies chest pain, urinary symptoms, abd pain.   per NH nurse, patient ahd low appetite, poor PO intake, was not participating in rehab. was not complaining of any other symptoms.   was discharged to NH on Aztreonam, flagyl and zyvox. the last one was never given there due to delay in delivery.   per family; at baseline she has mild dementia and she was fully independent before recent illness last week.  in ed 127/114, hr 127, 100% o2 sat on 8 Ls, temp 101.7 lactate 3.8, CXR showed b/l lower lobe opacities.   discharged to NH with a Smith catheter that was renewed in ED.   had ct abd in ED that showed Marked right heart enlargement with reflux contrast into the IVC and hepatic veins, compatible with right heart failure. Cholecystostomy tube in gallbladder fossa. and increased soft tissue density in the presacral space etiology not apparent on this exam however rectal pathology is a consideration. (12 Jan 2020 20:58)    PAST MEDICAL & SURGICAL HISTORY:  Afib  Cholecystostomy drain infection    patient seen and examined independently on morning rounds, chart reviewed and d/w medicine resident-    no overnight events-very lethargic and minimally responsive this morning-    PE:  GEN-NAD, lethargic, drowsy- noncommunicative  PULM-  fair air entry with decreased bs bilateral bases  CVS- +s1/s2 RRR +DANAY  GI- soft NT ND +bs, no rebound, no guarding, +cholecystostomy tube  EXT- no edema        Labs:                        12.6   14.15 )-----------( 45       ( 16 Jan 2020 05:13 )             38.4     CBC Full  -  ( 16 Jan 2020 05:13 )  WBC Count : 14.15 K/uL  RBC Count : 3.88 M/uL  Hemoglobin : 12.6 g/dL  Hematocrit : 38.4 %  Platelet Count - Automated : 45 K/uL  Mean Cell Volume : 99.0 fL  Mean Cell Hemoglobin : 32.5 pg  Mean Cell Hemoglobin Concentration : 32.8 g/dL  Auto Neutrophil # : 11.99 K/uL  Auto Lymphocyte # : 1.37 K/uL  Auto Monocyte # : 0.57 K/uL  Auto Eosinophil # : 0.08 K/uL  Auto Basophil # : 0.02 K/uL  Auto Neutrophil % : 84.8 %  Auto Lymphocyte % : 9.7 %  Auto Monocyte % : 4.0 %  Auto Eosinophil % : 0.6 %  Auto Basophil % : 0.1 %      01-16    151<H>  |  119<H>  |  48<H>  ----------------------------<  88  5.1<H>   |  17  |  0.9    Ca    8.6      16 Jan 2020 20:50    TPro  5.5<L>  /  Alb  3.2<L>  /  TBili  1.4<H>  /  DBili  x   /  AST  45<H>  /  ALT  89<H>  /  AlkPhos  66  01-16      Microbiology:      Vital Signs Last 24 Hrs  T(C): 35.9 (17 Jan 2020 07:36), Max: 35.9 (17 Jan 2020 07:36)  T(F): 96.7 (17 Jan 2020 07:36), Max: 96.7 (17 Jan 2020 07:36)  HR: 140 (17 Jan 2020 07:36) (95 - 140)  BP: 118/84 (17 Jan 2020 07:36) (99/55 - 118/84)  BP(mean): 96 (17 Jan 2020 07:36) (75 - 96)  RR: 21 (17 Jan 2020 07:36) (18 - 21)  SpO2: --    I&O's Summary    16 Jan 2020 07:01  -  17 Jan 2020 07:00  --------------------------------------------------------  IN: 100 mL / OUT: 180 mL / NET: -80 mL                 12.6   14.15 )-----------( 45       ( 16 Jan 2020 05:13 )             38.4     01-16    154<H>  |  116<H>  |  53<H>  ----------------------------<  122<H>  3.9   |  24  |  0.9    Ca    8.6      16 Jan 2020 05:13                MEDICATIONS  (STANDING):  chlorhexidine 4% Liquid 1 Application(s) Topical <User Schedule>  enoxaparin Injectable 40 milliGRAM(s) SubCutaneous at bedtime  meropenem  IVPB 1000 milliGRAM(s) IV Intermittent every 12 hours  metoprolol succinate ER 25 milliGRAM(s) Oral daily

## 2020-01-17 NOTE — PROGRESS NOTE ADULT - ASSESSMENT
a/p:  # Acute hypoxic respiratory failure - severe sepsis present on admission.--suspected GNR pneumonia  -ct with bilateral pleural effusion--family refused thoracenteisis--continue medical mangemtn- appreciate pulm consult  -palliative care consult  -cont iv abx (on merrem)  -bcx negative to date  -TTE with ? thickening of aortic valve cusp (cannot r/o BE) however as bcx negative will not pursue further w/u (no plan for ANA to r/o BE at this time)  -monitor vs closely  -appreciate ID Recomm  -obtain [facility] med records (including microbiology records)  -monitor wbc and fever curve  -RVP negative    # Metabolic Encephalopathy with underlying dementia >  - acute metab enceph from sepsis/ pneumonia--also complicated course at OSH including  cholcysitits with cholecystostomy tube     #HFrEF- acute on chronic  -repeat cxr  -ECHO with EF 20-25%, enlarged LF, decreased RV systolic function, mod MVR, severe TVR and severe pulm htn- aortic vlve cusp thickening  -given lasix 20 mg iv 2 days ago---monitor daily weights and i/o  -cardiology following  -monitor electrolytes---hypernatremia slowly improving (today 151)  -trend bmp  -monitor fluid status---  - ivf D51/2 NS @ 75 cc/hr (x8 hrs only) and repeat am bmp (patietn with poor po intake over last few days and concern for hypovolemia)    #NSTEMI type 2  -monitor bp and symptoms  -no current cp    #thrombocytopenia  -trend plts---today 45k  -hit ab negative  -monitor cbc     # History afib on 2.5 mg Eliquis   - hold Eliquis  - c/w metoprolol 25 mg QD     # Cholecystitis s/p cholecystostomy tube placed last week at [facility]  -Follow up outpatient for removal in 2-3 weeks  -get [facility] med records    # RITU on CKD III  -Cr downtrending  -Monitor BMP    #DVT/GI ppx  DNR/DNI  purree diet with aspiration precautions---f/u with sp swallow and PT/Rehab    #Progress Note Handoff  Disposition: Unknown at this time  palliatve care consult

## 2020-01-17 NOTE — CONSULT NOTE ADULT - SUBJECTIVE AND OBJECTIVE BOX
REQUESTED OF: DR Rebolledo    Chart reviewed, Hospital Day 5    SADE HARRIS 89yFemale  HPI:  89 year old with pmhx of Afib on Eliquis, diverticulosis, GERD, chronic Smith right eye blindness, presents from NH with respiratory distress for 2 days duration.   Patient was discharged to NH from [facility] after being treated for septic shock secondary to PNA, her previous hospitalization was complicated with cholecystitis managed with cholecystostomy tube, and RITU. {family stated today that not a surgical candidate}  Per family she is lethargic and poorly communicate since she admitted to NH , has been having worsening SOB for the last 2 days, SOB is associated with cough, runny nose, denies chest pain, urinary symptoms, abd pain.   per NH nurse, patient ahd low appetite, poor PO intake, was not participating in rehab. was not complaining of any other symptoms.   was discharged to NH on Aztreonam, flagyl and zyvox. the last one was never given there due to delay in delivery.   per family; at baseline she has mild dementia and she was fully independent before recent illness last week.  in ed 127/114, hr 127, 100% o2 sat on 8 Ls, temp 101.7 lactate 3.8, CXR showed b/l lower lobe opacities.   discharged to NH with a Smith catheter that was renewed in ED.   had ct abd in ED that showed Marked right heart enlargement with reflux contrast into the IVC and hepatic veins, compatible with right heart failure. Cholecystostomy tube in gallbladder fossa. and increased soft tissue density in the presacral space etiology not apparent on this exam however rectal pathology is a consideration. (12 Jan 2020 20:58)    Since admission treated with ABX without improvement    ID consult: #Severe sepsis on admission (T>101F, Pulse>90,  (+) lactic acidosis, metabolic encephalopathy, RITU     CXR demonstrates bilateral basilar lung opacities, suspected GNR PNA;   MRSA PCR neg, RVP neg;  Blood & Urine cxs NGTD;   No evidence of worsening intraabdominal infection; #Right greater than left pleural effusions Seen by Pulmonary: probable fluid overload; son refused thoracentesis [I verified with Cas - he does not want to put her through more] - TTE with low EF & focal thickening of the non-coronary cusp of the aortic valve. Cannot rule out vegetation; #increased soft tissue density in the presacral space etiology not apparent on this exam however rectal pathology is a consideration.    Pulm consult: Bilateral pleural effusions R>L likely fluid overload/ EF 20 TO 25% doubt parapneumonic effusion; HO HFrEF; multiple co morbidities    PAST MEDICAL & SURGICAL HISTORY:  Afib  Cholecystostomy drain infection    Subjective and Objective:  Today,  Discussed with House staff and nursing    Focused Palliative Care Evaluation:                   Symptoms: occasional restlessness - self-limited; overall states feels better; nurse says more alert/responsive than in morning                                     Pain - abdominal discomfort - but unable to quantify more                                     Dyspnea 0                                     N/V 0                                     Appetite - minimal per nursing                                     Anxiety 0                                     Other _____________________                     Support Devices: nicholas drain; NC       PHYSICAL EXAM:    Constitutional: frail    Eyes: rt eye atrophied; left tracking; Pupil round and reactive     Respiratory: RR 20-22; diminished breath sounds; depth varies; no distress    Gastrointestinal: soft, no guarding/grimacing with palpation; biliary drainage for good amount bile    Extremities: +1    Neurological: A&O to self; otherwise confused and unable to fully engage    Skin: ecchymotic areas throughout    T(C): 35.9, Max: 35.9 (07:36)  HR: 140 (95 - 140)  BP: 118/84 (99/55 - 118/84)  RR: 21 (18 - 21)  SpO2: --    LABS/STUDIES:  01-16    151<H>  |  119<H>  |  48<H>  ----------------------------<  88  5.1<H>   |  17  |  0.9GFR 55-65    Ca    8.6      16 Jan 2020 20:50    TPro  5.5<L>  /  Alb  3.2<L>  /  TBili  1.4<H>  /  DBili  x   /  AST  45<H>  /  ALT  89<H>  /  AlkPhos  66  01-16                         12.6   14.15 )-----------( 45       ( 16 Jan 2020 05:13 )             38.4     MEDICATIONS  (STANDING):  chlorhexidine 4% Liquid 1 Application(s) Topical <User Schedule>  dextrose 5% + sodium chloride 0.45%. 1000 milliLiter(s) (75 mL/Hr) IV Continuous <Continuous>  enoxaparin Injectable 40 milliGRAM(s) SubCutaneous at bedtime  meropenem  IVPB 1000 milliGRAM(s) IV Intermittent every 12 hours  metoprolol succinate ER 25 milliGRAM(s) Oral daily    MEDICATIONS  (PRN):  morphine  - Injectable 0.5 milliGRAM(s) IV Push every 4 hours PRN Moderate Pain (4 - 6)    iStop: Reference #: [id number] no meds    PPS  Level 30%       Note PPS = Palliative Performance Scale; (c)2001, [employer]       Range from 100% meaning Full ambulation/self-care/intake/Level of Consciousness                                                                              to        10% meaning Bedbound/Unable to do any activity/extensive disease /Total Care/ No PO intake/ LOC=Full/drowsy/+/-confusion        (0% = death)                   Prior to acute illness, patient's functionality reportedly had been living along with assistance with IADLS up until 1/2/2020

## 2020-01-17 NOTE — SWALLOW BEDSIDE ASSESSMENT ADULT - NS SPL SWALLOW CLINIC TRIAL FT
+toleration w/o overt s/s penetration/aspiration w/ puree and nectar thick liquids. Pt not appropriate for advanced PO trials at this time.

## 2020-01-17 NOTE — PROGRESS NOTE ADULT - ASSESSMENT
89yo Female with a past medical history of paroxysmal afib on Eliquis, chronic diastolic CHF, mitral regurgitation, diverticulosis, chronic Smith, right eye blindness who presents from [facility] via EMS with respiratory distress x2 days. Patient admitted for acute hypoxic respiratory failure, with sepsis on admission.    IMPRESSION  #Severe sepsis on admission (T>101F, Pulse>90,  (+) lactic acidosis, metabolic encephalopathy, RITU     CXR demonstrates bilateral basilar lung opacities, suspected GNR PNA    MRSA PCR neg, RVP neg    Blood & Urine cxs NGTD     No evidence of worsening intraabdominal infection  #Right greater than left pleural effusions  Seen by Pulmonary: probable fluid overload; son refused thoracentesis  - TTE with low EF & focal thickening of the non-coronary cusp of the aortic valve. Cannot rule out vegetation.  #increased soft tissue density in the presacral space etiology not apparent on this exam however rectal pathology is a consideration.  #hypernatremia  Tmax 96.7  wbc 14.15    RECOMMENDATIONS  -Obtain micro records from [facility]  -Continue Meropenem (as this occurred while on zosyn)  -Await Palliative care eval

## 2020-01-17 NOTE — SWALLOW BEDSIDE ASSESSMENT ADULT - SLP GENERAL OBSERVATIONS
Pt received laying in bed, repositioned upright, alert and oriented to self Pt received laying in bed, repositioned upright, alert and oriented to self, +confusion.

## 2020-01-17 NOTE — CONSULT NOTE ADULT - ASSESSMENT
Consult Summary  89 year old who was independently living until 1/2 when was admitted to [facility] for above events and since has continued to have functional/clinical decline. Admitted, treated for severe sepsis, acute on chronic HF (EF 20-25%); NSTEMI, monitoring thrombocytopenia, Cholecystitis s/p cholecystostomy tube; RITU on CKD III. Treating hypernatremia with IVF. Has not had clinical improvement. Poor prognosis.       Morphine Equivalent Daily Dose (MEDD):  1.5mg    over 15 minutes spent discussing Advance Care Planning with two sons - Cas and Rashel. See GOC note. Explained CMO and limited treatments. All questions answered. They want patient to be comfortable, but need time to decide.    Recommendations:    Continue Current Medical Management   Continue morphine  - Injectable 0.5 milliGRAM(s) IV Push every 4 hours PRN for pain per palliative    DNR/I    If family decides on CMO - call for recommendations.             Please Call [phone] PRN Consult Summary  89 year old who was independently living until 1/2 when was admitted to [facility] for above events and since has continued to have functional/clinical decline. Admitted, treated for severe sepsis, acute on chronic HF (EF 20-25%); NSTEMI, monitoring thrombocytopenia, Cholecystitis s/p cholecystostomy tube; RITU on CKD III. Treating hypernatremia with IVF. Has not had clinical improvement. Poor prognosis.       Morphine Equivalent Daily Dose (MEDD):  1.5mg    over 16 minutes spent discussing Advance Care Planning with two sons - Cas and Rashel. See GOC note. Explained CMO and limited treatments. All questions answered. They want patient to be comfortable, but need time to decide.    Recommendations:    Continue Current Medical Management   Continue morphine  - Injectable 0.5 milliGRAM(s) IV Push every 4 hours PRN for pain per palliative    DNR/I    If family decides on CMO - call for recommendations.             Please Call [phone] PRN

## 2020-01-17 NOTE — PROGRESS NOTE ADULT - SUBJECTIVE AND OBJECTIVE BOX
SADE HARRIS  89y, Female  Allergy: penicillins (Other (U))  sulfonamides (Other (U))      CHIEF COMPLAINT: respiratory distress (2020 14:14)      INTERVAL EVENTS/HPI  - No acute events overnight  - T(F): , Max: 96.7 (20 @ 07:36)  - Denies any worsening symptoms  - Tolerating medication  -     ROS  General: Denies fevers, chills, nightsweats, weight loss  HEENT: Denies headache, rhinorrhea, sore throat, eye pain  CV: Denies CP, palpitations  PULM: Denies SOB, cough  GI: Denies abdominal pain, diarrhea  : Denies dysuria, hematuria  MSK: Denies arthralgias  SKIN: Denies rash   NEURO: Denies paresthesias, weakness  PSYCH: Denies depression    FH non-contributory   Social Hx non-contributory    VITALS:  T(F): 96.7, Max: 96.7 (20 @ 07:36)  HR: 140  BP: 118/84  RR: 21Vital Signs Last 24 Hrs  T(C): 35.9 (2020 07:36), Max: 35.9 (2020 07:36)  T(F): 96.7 (2020 07:36), Max: 96.7 (2020 07:36)  HR: 140 (2020 07:36) (95 - 140)  BP: 118/84 (2020 07:36) (99/55 - 118/84)  BP(mean): 96 (2020 07:36) (75 - 96)  RR: 21 (2020 07:36) (18 - 21)  SpO2: 90% (2020 11:55) (90% - 90%)    PHYSICAL EXAM:  Gen: NAD, resting in bed  HEENT: Normocephalic, atraumatic  Neck: supple, no lymphadenopathy  CV: Regular rate & regular rhythm  Lungs: decreased BS at bases, no fremitus  Abdomen: Soft, BS present  Ext: Warm, well perfused  Neuro: non focal, awake  Skin: no rash, no erythema      TESTS & MEASUREMENTS:                        12.6   14.15 )-----------( 45       ( 2020 05:13 )             38.4     01-16    151<H>  |  119<H>  |  48<H>  ----------------------------<  88  5.1<H>   |  17  |  0.9    Ca    8.6      2020 20:50    TPro  5.5<L>  /  Alb  3.2<L>  /  TBili  1.4<H>  /  DBili  x   /  AST  45<H>  /  ALT  89<H>  /  AlkPhos  66      eGFR if : 65 mL/min/1.73M2 (20 @ 20:50)  eGFR if Non African American: 56 mL/min/1.73M2 (20 @ 20:50)  eGFR if Non African American: 65 mL/min/1.73M2 (20 @ 18:17)  eGFR if : 75 mL/min/1.73M2 (20 @ 18:17)    LIVER FUNCTIONS - ( 2020 18:17 )  Alb: 3.2 g/dL / Pro: 5.5 g/dL / ALK PHOS: 66 U/L / ALT: 89 U/L / AST: 45 U/L / GGT: x               Culture - Blood (collected 20 @ 11:26)  Source: .Blood None  Preliminary Report (20 @ 23:02):    No growth to date.    Culture - Urine (collected 20 @ 15:50)  Source: .Urine Clean Catch (Midstream)  Final Report (20 @ 20:01):    No growth    Culture - Blood (collected 20 @ 11:55)  Source: .Blood Blood  Preliminary Report (20 @ 16:02):    No growth to date.    Culture - Blood (collected 20 @ 11:50)  Source: .Blood Blood  Preliminary Report (20 @ 16:02):    No growth to date.        Lactate, Blood: 2.7 mmol/L (20 @ 18:17)  Lactate, Blood: 2.8 mmol/L (20 @ 11:26)  Lactate, Blood: 2.5 mmol/L (20 @ 00:37)  Blood Gas Venous - Lactate: 3.9 mmoL/L (20 @ 13:17)  Lactate, Blood: 3.7 mmol/L (20 @ 11:40)      INFECTIOUS DISEASES TESTING  Legionella Antigen, Urine: Negative (20 @ 00:15)  MRSA PCR Result.: Negative (20 @ 00:15)      RADIOLOGY & ADDITIONAL TESTS:  I have personally reviewed the last Chest xray  CXR      CT      CARDIOLOGY TESTING  Transthoracic Echocardiogram:    EXAM:  2-D ECHO (TTE) COMPLETE        PROCEDURE DATE:  01/15/2020      INTERPRETATION:  REPORT:    TRANSTHORACIC ECHOCARDIOGRAM REPORT         Patient Name:   SADE HARRIS Accession #: [id number]  Medical Rec #:  [id number]  Height:      63.0 in 160.0 cm  YOB: 1929 Weight:      154.0 lb 69.85 kg  Patient Age:    89 years   BSA:         1.73 m²  Patient Gender: F          BP:          113/63 mmHg       Date of Exam:        1/15/2020 9:51:47 AM  Referring Physician: [id number] FLACA  Sonographer:         CARISSA  Reading Physician:   Ander Cavanaugh M.D.    Procedure:   2D Echo/Doppler/Color Doppler Complete.  Indications: I31.9 - Disease of Pericardium, unspecified  Diagnosis:   Disease of pericardium, unspecified - I31.9        Summary:   1. Left ventricular ejection fraction, by visual estimation, is 20 to 25%.   2. Moderately enlarged right ventricle.   3. Severely reduced RV systolic function.   4. Degenerative mitral valve.   5. Mitral annular calcification.   6. Moderate mitral valve regurgitation.   7. Severe tricuspid regurgitation.   8. Aortic valve thickening with decreased leaflet opening.   9. Mild aortic regurgitation.  10. Focal thickening of the non-coronary cusp of the aortic valve. Cannot rule out vegetation.  11. Mild to moderate pulmonic valve regurgitation.  12. Estimated pulmonary artery systolic pressure is 67.8 mmHg assuming a right atrial pressure of 10 mmHg, which is consistent with severe pulmonary hypertension.    PHYSICIAN INTERPRETATION:  Left Ventricle: Left ventricular ejection fraction, by visual estimation, is 20 to 25%.  Right Ventricle: The right ventricular size is moderately enlarged. RV systolic function is severely reduced.  Left Atrium: Severely enlarged left atrium.  Right Atrium: Severely enlarged right atrium.  Mitral Valve: The mitral valve is degenerative in appearance. There is mitral annular calcification. Moderate mitral valve regurgitation is seen.  Tricuspid Valve: Severe tricuspid regurgitation is visualized. Estimated pulmonary artery systolic pressure is 67.8 mmHg assuming a right atrial pressure of 10 mmHg, which is consistent with severe pulmonary hypertension.  Aortic Valve: The aortic valve is trileaflet. Aortic valve thickening with decreased leaflet opening. Mild aortic valve regurgitation is seen. Focal thickening of the non-coronary cusp of the aortic valve. Cannot rule out vegetation.  Pulmonic Valve: Mild to moderate pulmonic valve regurgitation.       2D AND M-MODE MEASUREMENTS (normal ranges within parentheses):  Left                  Normal   Aorta/Left             Normal  Ventricle:                     Atrium:  IVSd (2D):  1.09 cm  (0.7-1.1) AoV Cusp       1.15  (1.5-2.6)  LVPWd (2D): 0.93 cm  (0.7-1.1) Separation:     cm  LVIDd (2D): 5.89 cm  (3.4-5.7) Left Atrium    4.50  (1.9-4.0)  LVIDs (2D): 4.67 cm            (Mmode):        cm  LV FS (2D):  20.7 %   (>25%)   LA Volume      86.6  Relative      0.32    (<0.42)  Index         ml/m²  Wall                           Right  Thickness                      Ventricle:                                 RVd (2D):      2.92 cm    SPECTRAL DOPPLER ANALYSIS:  Aortic Valve:  AoV VMax:    1.42 m/s AoV Area, Vmax: 1.78 cm² Vmax Indx: 1.03 cm²/m²  AoV VTI:     0.22 m   AoV Area, VTI: 1.64 cm² VTI Indx:  0.95 cm²/m²  AoV Pk Grad: 8.1 mmHg  AoV Mn Grad: 4.1 mmHg    LVOT Vmax: 0.82 m/s  LVOT VTI:  0.12 m  LVOT Diam: 1.98 cm    Mitral Valve:  MV VMax:    1.61 m/s  MV Mn Grad: 4.4 mmHg    Tricuspid Valve and PA/RV Systolic Pressure: TR Max Velocity: 3.80 m/s RA Pressure: 10 mmHg RVSP/PASP: 67.8 mmHg    Pulmonic Valve:  PV Max Velocity: 0.82 m/s PV Max P.7 mmHg PV Mean PG:       [id number] Ander Cavanaugh M.D., Electronically signed on 1/15/2020 at 5:55:10 PM              *** Final ***                    ANDER CAVANAUGH MD  This document has been electronically signed. Candido 15 2020  9:51AM             (01-15-20 @ 09:51)      MEDICATIONS  chlorhexidine 4% Liquid 1  enoxaparin Injectable 40  meropenem  IVPB 1000  metoprolol succinate ER 25      ANTIBIOTICS:  meropenem  IVPB 1000 milliGRAM(s) IV Intermittent every 12 hours      All available historical data has been reviewed

## 2020-01-17 NOTE — PROGRESS NOTE ADULT - SUBJECTIVE AND OBJECTIVE BOX
SADE HARRIS 89y Female      Patient is a 89y old  Female who presents with a chief complaint of respiratory distress (17 Jan 2020 08:21)        INTERVAL HPI/OVERNIGHT EVENTS: No acute events overnight. Patient was seen and evaluated at the bedside. The patient is unable to engage in meaningful ROS 2/2 mental status/encephalopathy. Lethargic and difficult to arouse on exam.     PHYSICAL EXAM:  GENERAL: NAD, obtunded   HEAD:  Normocephalic  EYES:  conjunctiva and sclera clear  ENMT: Moist mucous membranes  NECK: Supple  NERVOUS SYSTEM:  Obtunded, withdraws to pain  CHEST/LUNG: crackles  HEART: Tachy        Vital Signs Last 24 Hrs  T(C): 35.9 (17 Jan 2020 07:36), Max: 35.9 (17 Jan 2020 07:36)  T(F): 96.7 (17 Jan 2020 07:36), Max: 96.7 (17 Jan 2020 07:36)  HR: 140 (17 Jan 2020 07:36) (95 - 140)  BP: 118/84 (17 Jan 2020 07:36) (99/55 - 118/84)  BP(mean): 96 (17 Jan 2020 07:36) (75 - 96)  RR: 21 (17 Jan 2020 07:36) (18 - 21)  SpO2: --      Consultant(s) Notes Reviewed:  [X] YES  [ ] NO  Care Discussed with Consultants/Other Providers [X] YES  [ ] NO  Imaging Personally Reviewed:  [X] YES  [ ] NO      LABS:                        12.6   14.15 )-----------( 45       ( 16 Jan 2020 05:13 )             38.4     01-16    151<H>  |  119<H>  |  48<H>  ----------------------------<  88  5.1<H>   |  17  |  0.9    Ca    8.6      16 Jan 2020 20:50    TPro  5.5<L>  /  Alb  3.2<L>  /  TBili  1.4<H>  /  DBili  x   /  AST  45<H>  /  ALT  89<H>  /  AlkPhos  66  01-16    PT/INR - ( 16 Jan 2020 18:17 )   PT: 16.50 sec;   INR: 1.44 ratio         PTT - ( 16 Jan 2020 18:17 )  PTT:31.0 sec      CAPILLARY BLOOD GLUCOSE

## 2020-01-17 NOTE — GOALS OF CARE CONVERSATION - ADVANCED CARE PLANNING - CONVERSATION/DISCUSSION
Hospice Referral/Chaplaincy Referral/Prognosis/MOLST Discussed/Treatment Options/Palliative Care Referral/Diagnosis

## 2020-01-17 NOTE — GOALS OF CARE CONVERSATION - ADVANCED CARE PLANNING - CONVERSATION DETAILS
Palliative Care and medical resident spoke with sons to provide medical update and to introduce Palliative Care.  Sons have a full understanding of pt.'s medical condition and clinical status.  Sons state that pt. was functional and living independently up until recent admission to [facility] this month, and describe a rapid decline since this event.  Treatment options presented - full aggressive care vs. limited medical management vs. CMO with hospice referral. Pt. was DNR/I prior to admission, and family wishes to continue with medical management while they consider options, and will inform medical and palliative teams of their decision.  Hospice philosophy and services introduced.  DNR/I status.  Offered chaplaincy services.  Will continue to follow for C conversations.

## 2020-01-18 LAB
ALBUMIN SERPL ELPH-MCNC: 3 G/DL — LOW (ref 3.5–5.2)
ALP SERPL-CCNC: 69 U/L — SIGNIFICANT CHANGE UP (ref 30–115)
ALT FLD-CCNC: 68 U/L — HIGH (ref 0–41)
ANION GAP SERPL CALC-SCNC: 14 MMOL/L — SIGNIFICANT CHANGE UP (ref 7–14)
AST SERPL-CCNC: 47 U/L — HIGH (ref 0–41)
BASOPHILS # BLD AUTO: 0.02 K/UL — SIGNIFICANT CHANGE UP (ref 0–0.2)
BASOPHILS NFR BLD AUTO: 0.1 % — SIGNIFICANT CHANGE UP (ref 0–1)
BILIRUB SERPL-MCNC: 1.2 MG/DL — SIGNIFICANT CHANGE UP (ref 0.2–1.2)
BUN SERPL-MCNC: 49 MG/DL — HIGH (ref 10–20)
CALCIUM SERPL-MCNC: 8.8 MG/DL — SIGNIFICANT CHANGE UP (ref 8.5–10.1)
CHLORIDE SERPL-SCNC: 119 MMOL/L — HIGH (ref 98–110)
CO2 SERPL-SCNC: 22 MMOL/L — SIGNIFICANT CHANGE UP (ref 17–32)
CREAT SERPL-MCNC: 0.9 MG/DL — SIGNIFICANT CHANGE UP (ref 0.7–1.5)
CULTURE RESULTS: SIGNIFICANT CHANGE UP
EOSINOPHIL # BLD AUTO: 0.01 K/UL — SIGNIFICANT CHANGE UP (ref 0–0.7)
EOSINOPHIL NFR BLD AUTO: 0.1 % — SIGNIFICANT CHANGE UP (ref 0–8)
GLUCOSE SERPL-MCNC: 118 MG/DL — HIGH (ref 70–99)
HCT VFR BLD CALC: 38.1 % — SIGNIFICANT CHANGE UP (ref 37–47)
HGB BLD-MCNC: 12.2 G/DL — SIGNIFICANT CHANGE UP (ref 12–16)
IMM GRANULOCYTES NFR BLD AUTO: 0.7 % — HIGH (ref 0.1–0.3)
LYMPHOCYTES # BLD AUTO: 1.55 K/UL — SIGNIFICANT CHANGE UP (ref 1.2–3.4)
LYMPHOCYTES # BLD AUTO: 11.4 % — LOW (ref 20.5–51.1)
MAGNESIUM SERPL-MCNC: 2.2 MG/DL — SIGNIFICANT CHANGE UP (ref 1.8–2.4)
MCHC RBC-ENTMCNC: 31.4 PG — HIGH (ref 27–31)
MCHC RBC-ENTMCNC: 32 G/DL — SIGNIFICANT CHANGE UP (ref 32–37)
MCV RBC AUTO: 97.9 FL — SIGNIFICANT CHANGE UP (ref 81–99)
MONOCYTES # BLD AUTO: 0.81 K/UL — HIGH (ref 0.1–0.6)
MONOCYTES NFR BLD AUTO: 6 % — SIGNIFICANT CHANGE UP (ref 1.7–9.3)
NEUTROPHILS # BLD AUTO: 11.12 K/UL — HIGH (ref 1.4–6.5)
NEUTROPHILS NFR BLD AUTO: 81.7 % — HIGH (ref 42.2–75.2)
NRBC # BLD: 0 /100 WBCS — SIGNIFICANT CHANGE UP (ref 0–0)
PLATELET # BLD AUTO: 39 K/UL — LOW (ref 130–400)
POTASSIUM SERPL-MCNC: 5 MMOL/L — SIGNIFICANT CHANGE UP (ref 3.5–5)
POTASSIUM SERPL-SCNC: 5 MMOL/L — SIGNIFICANT CHANGE UP (ref 3.5–5)
PROT SERPL-MCNC: 5.4 G/DL — LOW (ref 6–8)
RBC # BLD: 3.89 M/UL — LOW (ref 4.2–5.4)
RBC # FLD: 17.4 % — HIGH (ref 11.5–14.5)
SODIUM SERPL-SCNC: 155 MMOL/L — HIGH (ref 135–146)
SPECIMEN SOURCE: SIGNIFICANT CHANGE UP
WBC # BLD: 13.6 K/UL — HIGH (ref 4.8–10.8)
WBC # FLD AUTO: 13.6 K/UL — HIGH (ref 4.8–10.8)

## 2020-01-18 PROCEDURE — 99232 SBSQ HOSP IP/OBS MODERATE 35: CPT

## 2020-01-18 PROCEDURE — 71045 X-RAY EXAM CHEST 1 VIEW: CPT | Mod: 26

## 2020-01-18 RX ORDER — SODIUM CHLORIDE 9 MG/ML
1000 INJECTION, SOLUTION INTRAVENOUS
Refills: 0 | Status: DISCONTINUED | OUTPATIENT
Start: 2020-01-18 | End: 2020-01-19

## 2020-01-18 RX ORDER — SODIUM CHLORIDE 9 MG/ML
250 INJECTION INTRAMUSCULAR; INTRAVENOUS; SUBCUTANEOUS ONCE
Refills: 0 | Status: COMPLETED | OUTPATIENT
Start: 2020-01-18 | End: 2020-01-18

## 2020-01-18 RX ADMIN — CHLORHEXIDINE GLUCONATE 1 APPLICATION(S): 213 SOLUTION TOPICAL at 05:41

## 2020-01-18 RX ADMIN — MORPHINE SULFATE 0.5 MILLIGRAM(S): 50 CAPSULE, EXTENDED RELEASE ORAL at 22:03

## 2020-01-18 RX ADMIN — MORPHINE SULFATE 0.5 MILLIGRAM(S): 50 CAPSULE, EXTENDED RELEASE ORAL at 22:30

## 2020-01-18 RX ADMIN — MORPHINE SULFATE 0.5 MILLIGRAM(S): 50 CAPSULE, EXTENDED RELEASE ORAL at 14:28

## 2020-01-18 RX ADMIN — SODIUM CHLORIDE 1000 MILLILITER(S): 9 INJECTION INTRAMUSCULAR; INTRAVENOUS; SUBCUTANEOUS at 12:09

## 2020-01-18 RX ADMIN — SODIUM CHLORIDE 100 MILLILITER(S): 9 INJECTION, SOLUTION INTRAVENOUS at 12:09

## 2020-01-18 RX ADMIN — SODIUM CHLORIDE 100 MILLILITER(S): 9 INJECTION, SOLUTION INTRAVENOUS at 22:09

## 2020-01-18 RX ADMIN — MORPHINE SULFATE 0.5 MILLIGRAM(S): 50 CAPSULE, EXTENDED RELEASE ORAL at 13:59

## 2020-01-18 RX ADMIN — ENOXAPARIN SODIUM 40 MILLIGRAM(S): 100 INJECTION SUBCUTANEOUS at 21:36

## 2020-01-18 RX ADMIN — MORPHINE SULFATE 0.5 MILLIGRAM(S): 50 CAPSULE, EXTENDED RELEASE ORAL at 09:49

## 2020-01-18 RX ADMIN — MORPHINE SULFATE 0.5 MILLIGRAM(S): 50 CAPSULE, EXTENDED RELEASE ORAL at 10:22

## 2020-01-18 NOTE — PHYSICAL THERAPY INITIAL EVALUATION ADULT - GENERAL OBSERVATIONS, REHAB EVAL
As pre resident. family in the process for palliative/hospice care. Pt is not candidate for PT at this time. further PT referral PRN.

## 2020-01-18 NOTE — CHART NOTE - NSCHARTNOTEFT_GEN_A_CORE
Registered Dietitian Follow-Up     Patient Profile Reviewed                           Yes [x]   No []     Nutrition History Previously Obtained        Yes [x]  No []       Pertinent Medical Interventions: p/w respiratory distress. Acute hypoxic respiratory failure. Metabolic Encephalopathy with underlying dementia. HFrEF- acute on chronic noted. NSTEMI type 2 noted. RITU on CKD III noted.     Diet order: Dysphagia 1 Pureed diet with nectar consistency fluids.     Anthropometrics:  - Ht. 152.4 cm.  - Wt. no new wt at this time  - BMI 29.9 (using previous wt 69.4 kg 1/13)  - IBW 45.4 kg.     Pertinent Lab Data: 1/18: RBC-3.89, Na-155, Cl-119, BUN-49, gluc-118     Pertinent Meds: enoxaparin, dextrose 5% 1 L solution at 100 mL/hr (170 kcal total), metoprolol; received STAT lasix 1/14, no lasix order at this time     Physical Findings:  - Appearance: 2+ edema (L arm). Confused, disoriented.  - GI function: Last BM 1/17  - Tubes: no feeding tubes  - Oral/Mouth cavity: Per 1/17 SLP eval - mild oral dysphagia for puree and nectar thick liquids w/o overt s/s penetration/aspiration. Recommends Dysphagia 1 (puree) w/ nectar thick liquids.  - Skin: surgical incision, ecchymosis     Nutrition Requirements  Weight Used: 69.4 kg ABW per 1/15 RD assessment     Estimated Energy Needs    Continue [x]  Adjust []  [id number] (20-25 kcal/kg ABW)        Estimated Protein Needs    Continue []  Adjust [x]  42-56 g/day (0.6-0.8 g/kg ABW) - used this range as BMI is borderline obese + pt with RITU on CKD        Estimated Fluid Needs        Continue [x]  Adjust []  per LIP     Nutrient Intake: Not meeting estimated nutrient needs at this time, consuming <25% of meals.        [x] Previous Nutrition Diagnosis: Inadequate energy intake            [x] Ongoing          [] Resolved     Nutrition Intervention: Meals & Snacks, Medical Food Supplements, Vitamin & Mineral Supplements      Goal/Expected Outcome: Pt to demonstrate improved tolerance to diet order with at least 50% po intake achieved over next 3 days.     Indicator/Monitoring: Energy intake, diet order, glucose profile, renal profile, nutrition focused physical findings, body composition.    Recommendation: (1) Order Ensure Pudding q12hrs. (2) Diet order texture/consistency per SLP. Reviewed with LIP on unit.

## 2020-01-18 NOTE — PROGRESS NOTE ADULT - SUBJECTIVE AND OBJECTIVE BOX
89 year old who was independently living until 1/2 when was admitted to [facility] for above events and since has continued to have functional/clinical decline. Admitted, treated for severe sepsis, acute on chronic HF (EF 20-25%); NSTEMI, monitoring thrombocytopenia, Cholecystitis s/p cholecystostomy tube; RITU on CKD III. Treating hypernatremia with IVF. Has not had clinical improvement. Poor prognosis.     Vital Signs Last 24 Hrs  T(C): 35.6 (18 Jan 2020 08:35), Max: 37 (17 Jan 2020 15:41)  T(F): 96 (18 Jan 2020 08:35), Max: 98.6 (17 Jan 2020 15:41)  HR: 122 (18 Jan 2020 14:03) (112 - 126)  BP: 102/75 (18 Jan 2020 14:03) (82/54 - 137/58)  BP(mean): 86 (18 Jan 2020 14:03) (67 - 86)  RR: 18 (18 Jan 2020 08:35) (18 - 18)  SpO2: --      Constitutional: frail, A&O to self only; confused and unable to fully engage.  mild respiratroy distress, shallow breathing, tachypnea, Eyes: rt eye atrophied; left tracking; Pupil round and reactive abdomen no guarding/grimacing with palpation; biliary drainage for good amount bile  Extremities: +1 edema, Skin: ecchymotic areas throughout    a/P    #Severe sepsis on admission (T>101F, Pulse>90,  (+) lactic acidosis, metabolic encephalopathy, RITU     CXR demonstrates bilateral basilar lung opacities, suspected GNR PNA;   MRSA PCR neg, RVP neg;  Blood & Urine cxs NGTD;   No evidence of worsening intraabdominal infection;  IVF      #Right greater than left pleural effusions Seen by Pulmonary: probable fluid overload; son refused thoracentesis [I verified with Cas - he does not want to put her through more]     DNI, DNR  palliative team is following  poor prognosis   cannot tolerate any rehab at this time.

## 2020-01-19 LAB
ALBUMIN SERPL ELPH-MCNC: 3 G/DL — LOW (ref 3.5–5.2)
ALP SERPL-CCNC: 64 U/L — SIGNIFICANT CHANGE UP (ref 30–115)
ALT FLD-CCNC: 57 U/L — HIGH (ref 0–41)
ANION GAP SERPL CALC-SCNC: 11 MMOL/L — SIGNIFICANT CHANGE UP (ref 7–14)
AST SERPL-CCNC: 33 U/L — SIGNIFICANT CHANGE UP (ref 0–41)
BASOPHILS # BLD AUTO: 0.02 K/UL — SIGNIFICANT CHANGE UP (ref 0–0.2)
BASOPHILS NFR BLD AUTO: 0.2 % — SIGNIFICANT CHANGE UP (ref 0–1)
BILIRUB SERPL-MCNC: 1 MG/DL — SIGNIFICANT CHANGE UP (ref 0.2–1.2)
BUN SERPL-MCNC: 49 MG/DL — HIGH (ref 10–20)
CALCIUM SERPL-MCNC: 8.5 MG/DL — SIGNIFICANT CHANGE UP (ref 8.5–10.1)
CHLORIDE SERPL-SCNC: 117 MMOL/L — HIGH (ref 98–110)
CO2 SERPL-SCNC: 24 MMOL/L — SIGNIFICANT CHANGE UP (ref 17–32)
CREAT SERPL-MCNC: 0.9 MG/DL — SIGNIFICANT CHANGE UP (ref 0.7–1.5)
EOSINOPHIL # BLD AUTO: 0.08 K/UL — SIGNIFICANT CHANGE UP (ref 0–0.7)
EOSINOPHIL NFR BLD AUTO: 0.7 % — SIGNIFICANT CHANGE UP (ref 0–8)
GLUCOSE SERPL-MCNC: 147 MG/DL — HIGH (ref 70–99)
HCT VFR BLD CALC: 35.5 % — LOW (ref 37–47)
HGB BLD-MCNC: 11.1 G/DL — LOW (ref 12–16)
IMM GRANULOCYTES NFR BLD AUTO: 0.6 % — HIGH (ref 0.1–0.3)
LYMPHOCYTES # BLD AUTO: 17.9 % — LOW (ref 20.5–51.1)
LYMPHOCYTES # BLD AUTO: 2.08 K/UL — SIGNIFICANT CHANGE UP (ref 1.2–3.4)
MCHC RBC-ENTMCNC: 31.3 G/DL — LOW (ref 32–37)
MCHC RBC-ENTMCNC: 31.3 PG — HIGH (ref 27–31)
MCV RBC AUTO: 100 FL — HIGH (ref 81–99)
MONOCYTES # BLD AUTO: 1.01 K/UL — HIGH (ref 0.1–0.6)
MONOCYTES NFR BLD AUTO: 8.7 % — SIGNIFICANT CHANGE UP (ref 1.7–9.3)
NEUTROPHILS # BLD AUTO: 8.38 K/UL — HIGH (ref 1.4–6.5)
NEUTROPHILS NFR BLD AUTO: 71.9 % — SIGNIFICANT CHANGE UP (ref 42.2–75.2)
NRBC # BLD: 0 /100 WBCS — SIGNIFICANT CHANGE UP (ref 0–0)
PLATELET # BLD AUTO: 44 K/UL — LOW (ref 130–400)
POTASSIUM SERPL-MCNC: 4.5 MMOL/L — SIGNIFICANT CHANGE UP (ref 3.5–5)
POTASSIUM SERPL-SCNC: 4.5 MMOL/L — SIGNIFICANT CHANGE UP (ref 3.5–5)
PROT SERPL-MCNC: 5.1 G/DL — LOW (ref 6–8)
RBC # BLD: 3.55 M/UL — LOW (ref 4.2–5.4)
RBC # FLD: 17 % — HIGH (ref 11.5–14.5)
SODIUM SERPL-SCNC: 152 MMOL/L — HIGH (ref 135–146)
WBC # BLD: 11.64 K/UL — HIGH (ref 4.8–10.8)
WBC # FLD AUTO: 11.64 K/UL — HIGH (ref 4.8–10.8)

## 2020-01-19 PROCEDURE — 99232 SBSQ HOSP IP/OBS MODERATE 35: CPT

## 2020-01-19 RX ORDER — SODIUM CHLORIDE 9 MG/ML
1000 INJECTION, SOLUTION INTRAVENOUS
Refills: 0 | Status: DISCONTINUED | OUTPATIENT
Start: 2020-01-19 | End: 2020-01-20

## 2020-01-19 RX ADMIN — CHLORHEXIDINE GLUCONATE 1 APPLICATION(S): 213 SOLUTION TOPICAL at 05:21

## 2020-01-19 RX ADMIN — MORPHINE SULFATE 0.5 MILLIGRAM(S): 50 CAPSULE, EXTENDED RELEASE ORAL at 12:00

## 2020-01-19 RX ADMIN — MORPHINE SULFATE 0.5 MILLIGRAM(S): 50 CAPSULE, EXTENDED RELEASE ORAL at 11:47

## 2020-01-19 RX ADMIN — ENOXAPARIN SODIUM 40 MILLIGRAM(S): 100 INJECTION SUBCUTANEOUS at 21:07

## 2020-01-19 NOTE — PROGRESS NOTE ADULT - SUBJECTIVE AND OBJECTIVE BOX
SADE HARRIS 89y Female      Patient is a 89y old  Female who presents with a chief complaint of respiratory distress (18 Jan 2020 15:12)        INTERVAL HPI/OVERNIGHT EVENTS: No acute events overnight. Patient was seen and evaluated at the bedside. The patient was sleeping in bed, only roused by a deep sternal rub. ROS not assessed secondary to encephalopathy.       PHYSICAL EXAM:  GENERAL: NAD, cachectic, ill   HEAD:  Normocephalic  EYES:  conjunctiva and sclera clear  ENMT: Moist mucous membranes  NECK: Supple  NERVOUS SYSTEM: Obtunded   CHEST/LUNG: Good air exchange bilaterally, +bilat crackles  HEART: Regular rate and rhythm        Vital Signs Last 24 Hrs  T(C): 35.6 (19 Jan 2020 07:45), Max: 36.9 (18 Jan 2020 16:00)  T(F): 96 (19 Jan 2020 07:45), Max: 98.5 (18 Jan 2020 16:00)  HR: 115 (19 Jan 2020 07:45) (54 - 132)  BP: 112/82 (19 Jan 2020 07:45) (102/75 - 112/82)  BP(mean): 104 (19 Jan 2020 07:45) (77 - 104)  RR: 20 (19 Jan 2020 07:45) (18 - 20)  SpO2: --      Consultant(s) Notes Reviewed:  [X] YES  [ ] NO  Care Discussed with Consultants/Other Providers [X] YES  [ ] NO  Imaging Personally Reviewed:  [X] YES  [ ] NO      LABS:                        11.1   11.64 )-----------( 44       ( 19 Jan 2020 05:35 )             35.5     01-19    152<H>  |  117<H>  |  49<H>  ----------------------------<  147<H>  4.5   |  24  |  0.9    Ca    8.5      19 Jan 2020 05:35  Mg     2.2     01-18    TPro  5.1<L>  /  Alb  3.0<L>  /  TBili  1.0  /  DBili  x   /  AST  33  /  ALT  57<H>  /  AlkPhos  64  01-19          CAPILLARY BLOOD GLUCOSE

## 2020-01-19 NOTE — PROGRESS NOTE ADULT - SUBJECTIVE AND OBJECTIVE BOX
Patient is a 89y old  Female who presents with a chief complaint of respiratory distress (16 Jan 2020 10:16)    HPI:  89 year old with pmhx of Afib on Eliquis, diverticulosis, GERD, chronic Smith right eye blindness, presents from NH with respiratory distress for 2 days duration.   Patient was discharged to NH from [facility] after being treated for septic shock secondary to PNA, her previous hospitalization was complicated with cholecystitis managed with cholecystostomy tube, and RITU.   Per family she is lethargic and poorly communicate since she admitted to NH , has been having worsening SOB for the last 2 days, SOB is associated with cough, runny nose, denies chest pain, urinary symptoms, abd pain.   per NH nurse, patient ahd low appetite, poor PO intake, was not participating in rehab. was not complaining of any other symptoms.   was discharged to NH on Aztreonam, flagyl and zyvox. the last one was never given there due to delay in delivery.   per family; at baseline she has mild dementia and she was fully independent before recent illness last week.  in ed 127/114, hr 127, 100% o2 sat on 8 Ls, temp 101.7 lactate 3.8, CXR showed b/l lower lobe opacities.   discharged to NH with a Smith catheter that was renewed in ED.   had ct abd in ED that showed Marked right heart enlargement with reflux contrast into the IVC and hepatic veins, compatible with right heart failure. Cholecystostomy tube in gallbladder fossa. and increased soft tissue density in the presacral space etiology not apparent on this exam however rectal pathology is a consideration. (12 Jan 2020 20:58)    PAST MEDICAL & SURGICAL HISTORY:  Afib  Cholecystostomy drain infection    patient seen and examined independently on morning rounds, chart reviewed and d/w medicine resident-    no overnight events-son bedside- patient less drowsy (more awake today)- drinking small sips of iced coffee with son- family do not want any invasive procedure (refused thoracentesis) and will decide regarding goc (? hospice/Consideration for comfort measures and make decision over next 24 hrs)    PE:  GEN-NAD, alert and awake- answering simple questions- more communicating today  PULM-  fair air entry with decreased bs bilateral bases  CVS- +s1/s2 RRR +DANAY  GI- soft NT ND +bs, no rebound, no guarding, +cholecystostomy tube  EXT- no edema        Labs:                        11.1   11.64 )-----------( 44       ( 19 Jan 2020 05:35 )             35.5     CBC Full  -  ( 19 Jan 2020 05:35 )  WBC Count : 11.64 K/uL  RBC Count : 3.55 M/uL  Hemoglobin : 11.1 g/dL  Hematocrit : 35.5 %  Platelet Count - Automated : 44 K/uL  Mean Cell Volume : 100.0 fL  Mean Cell Hemoglobin : 31.3 pg  Mean Cell Hemoglobin Concentration : 31.3 g/dL  Auto Neutrophil # : 8.38 K/uL  Auto Lymphocyte # : 2.08 K/uL  Auto Monocyte # : 1.01 K/uL  Auto Eosinophil # : 0.08 K/uL  Auto Basophil # : 0.02 K/uL  Auto Neutrophil % : 71.9 %  Auto Lymphocyte % : 17.9 %  Auto Monocyte % : 8.7 %  Auto Eosinophil % : 0.7 %  Auto Basophil % : 0.2 %      01-19    152<H>  |  117<H>  |  49<H>  ----------------------------<  147<H>  4.5   |  24  |  0.9    Ca    8.5      19 Jan 2020 05:35  Mg     2.2     01-18    TPro  5.1<L>  /  Alb  3.0<L>  /  TBili  1.0  /  DBili  x   /  AST  33  /  ALT  57<H>  /  AlkPhos  64  01-19      Microbiology:      Vital Signs Last 24 Hrs  T(C): 35.6 (19 Jan 2020 07:45), Max: 36.9 (18 Jan 2020 16:00)  T(F): 96 (19 Jan 2020 07:45), Max: 98.5 (18 Jan 2020 16:00)  HR: 115 (19 Jan 2020 07:45) (54 - 132)  BP: 112/82 (19 Jan 2020 07:45) (105/52 - 112/82)  BP(mean): 104 (19 Jan 2020 07:45) (77 - 104)  RR: 20 (19 Jan 2020 07:45) (18 - 20)  SpO2: --    I&O's Summary    18 Jan 2020 07:01  -  19 Jan 2020 07:00  --------------------------------------------------------  IN: 1100 mL / OUT: 10 mL / NET: 1090 mL

## 2020-01-19 NOTE — PROGRESS NOTE ADULT - ASSESSMENT
a/p:  # Acute hypoxic respiratory failure - severe sepsis present on admission.--suspected GNR pneumonia  -ct with bilateral pleural effusion--family refused thoracentesis and are deciding about GOC (consideration for comfort mesuares)---do not want any invasive procedures   -palliative care following  -cont iv abx (on merrem)  -bcx negative to date  -TTE with ? thickening of aortic valve cusp (cannot r/o BE) however as bcx negative will not pursue further w/u (no plan for ANA to r/o BE)  -monitor vs closely  -appreciate ID Recomm  -monitor wbc and fever curve  -RVP negative    # Metabolic Encephalopathy with underlying dementia >  - acute metab enceph from sepsis/ pneumonia--also complicated course at OSH including cholcysitits with cholecystostomy tube     #HFrEF- acute on chronic  -ECHO with EF 20-25%, enlarged LF, decreased RV systolic function, mod MVR, severe TVR and severe pulm htn- aortic vlve cusp thickening  -monitor daily weights and i/o  -cardiology following    #hypernatremia  -slowly improving (Na 152 this am)--repeat bmp daily  -monitor electrolytes----monitor fluid status  - ivf D51/2 NS @ 75 cc/hr (x8 hrs time period only 2/2 HFrEF adn concern for exacerbation    #NSTEMI type 2  -monitor bp and symptoms  -no current cp    #thrombocytopenia  -trend plts---today 44k--no signs of active bleeding but + diffuse generalized ecchymosis  -hit ab negative  -monitor cbc     # History afib on 2.5 mg Eliquis   - hold Eliquis  - c/w metoprolol 25 mg QD       # RITU on CKD III  -Cr downtrending  -Monitor BMP    #DVT/GI ppx  DNR/DNI  purree diet with aspiration precautions---f/u with sp swallow and PT/Rehab    #Progress Note Handoff  Disposition: Unknown at this time  palliatve care consult--family will decide about possible comfort measures---consider hospice consult if decide on comfort care only

## 2020-01-19 NOTE — PROGRESS NOTE ADULT - ASSESSMENT
89 year old with pmhx of Afib on Eliquis, diverticulosis, GERD, HFrEF, chronic Smith, right eye blindness, presents from NH with respiratory distress for 2 days duration. admitted for acute hypoxic resp failure.      # Acute hypoxic respiratory failure  - Bilateral pleural effusion, no thora at this time. Family refusing anyway.  - daily CXR  - Off antibiotics, no improvement when was being treated with them  - Cultures NGTD  - TTE with thickening of aortic valve cusp however as bcx negative will not get ANA to r/o BE unless clinical change or if bcx comes back +  - obtain [facility] med records   - RVP negative  - very poor prognosis, family made pt DNR/I, deciding on CMO, but need time.     # Metabolic Encephalopathy with underlying dementia   - Acute metab enceph from infectious process, but likely multifactorial      #HFrEF- acute on chronic  - ECHO with EF 20-25%, enlarged LF, decreased RV systolic function, mod MVR, severe TVR and severe pulm htn- aortic vlve cusp thickening  - monitor electrolytes, remains hypernatremic. Must give fluids conservatively.     # NSTEMI type 2  - monitor bp and symptoms  - no current CP    # Thrombocytopenia  - trend plts---today 44k  - HIT ab negative  - monitor CBC     # History afib on 2.5 mg Eliquis   - hold Eliquis  - c/w metoprolol 25 mg daily     # Cholecystitis s/p cholecystostomy tube placed last week at [facility]  - Supposed to follow up outpatient for removal in 2-3 weeks    DVT/GI ppx  DNR/DNI  purree diet with aspiration precautions---f/u with sp swallow and PT/Rehab  Very poor prognosis

## 2020-01-20 LAB
ANION GAP SERPL CALC-SCNC: 14 MMOL/L — SIGNIFICANT CHANGE UP (ref 7–14)
ANION GAP SERPL CALC-SCNC: 14 MMOL/L — SIGNIFICANT CHANGE UP (ref 7–14)
BASOPHILS # BLD AUTO: 0.01 K/UL — SIGNIFICANT CHANGE UP (ref 0–0.2)
BASOPHILS NFR BLD AUTO: 0.1 % — SIGNIFICANT CHANGE UP (ref 0–1)
BUN SERPL-MCNC: 41 MG/DL — HIGH (ref 10–20)
BUN SERPL-MCNC: 42 MG/DL — HIGH (ref 10–20)
CALCIUM SERPL-MCNC: 8.5 MG/DL — SIGNIFICANT CHANGE UP (ref 8.5–10.1)
CALCIUM SERPL-MCNC: 8.5 MG/DL — SIGNIFICANT CHANGE UP (ref 8.5–10.1)
CHLORIDE SERPL-SCNC: 116 MMOL/L — HIGH (ref 98–110)
CHLORIDE SERPL-SCNC: 116 MMOL/L — HIGH (ref 98–110)
CO2 SERPL-SCNC: 21 MMOL/L — SIGNIFICANT CHANGE UP (ref 17–32)
CO2 SERPL-SCNC: 22 MMOL/L — SIGNIFICANT CHANGE UP (ref 17–32)
CREAT SERPL-MCNC: 0.8 MG/DL — SIGNIFICANT CHANGE UP (ref 0.7–1.5)
CREAT SERPL-MCNC: 0.8 MG/DL — SIGNIFICANT CHANGE UP (ref 0.7–1.5)
EOSINOPHIL # BLD AUTO: 0.02 K/UL — SIGNIFICANT CHANGE UP (ref 0–0.7)
EOSINOPHIL NFR BLD AUTO: 0.2 % — SIGNIFICANT CHANGE UP (ref 0–8)
GLUCOSE SERPL-MCNC: 74 MG/DL — SIGNIFICANT CHANGE UP (ref 70–99)
GLUCOSE SERPL-MCNC: 88 MG/DL — SIGNIFICANT CHANGE UP (ref 70–99)
HCT VFR BLD CALC: 36.4 % — LOW (ref 37–47)
HGB BLD-MCNC: 11.5 G/DL — LOW (ref 12–16)
IMM GRANULOCYTES NFR BLD AUTO: 0.8 % — HIGH (ref 0.1–0.3)
LYMPHOCYTES # BLD AUTO: 1.4 K/UL — SIGNIFICANT CHANGE UP (ref 1.2–3.4)
LYMPHOCYTES # BLD AUTO: 13 % — LOW (ref 20.5–51.1)
MCHC RBC-ENTMCNC: 31.1 PG — HIGH (ref 27–31)
MCHC RBC-ENTMCNC: 31.6 G/DL — LOW (ref 32–37)
MCV RBC AUTO: 98.4 FL — SIGNIFICANT CHANGE UP (ref 81–99)
MONOCYTES # BLD AUTO: 0.85 K/UL — HIGH (ref 0.1–0.6)
MONOCYTES NFR BLD AUTO: 7.9 % — SIGNIFICANT CHANGE UP (ref 1.7–9.3)
NEUTROPHILS # BLD AUTO: 8.4 K/UL — HIGH (ref 1.4–6.5)
NEUTROPHILS NFR BLD AUTO: 78 % — HIGH (ref 42.2–75.2)
NRBC # BLD: 0 /100 WBCS — SIGNIFICANT CHANGE UP (ref 0–0)
PLATELET # BLD AUTO: 60 K/UL — LOW (ref 130–400)
POTASSIUM SERPL-MCNC: 4.4 MMOL/L — SIGNIFICANT CHANGE UP (ref 3.5–5)
POTASSIUM SERPL-MCNC: 4.6 MMOL/L — SIGNIFICANT CHANGE UP (ref 3.5–5)
POTASSIUM SERPL-SCNC: 4.4 MMOL/L — SIGNIFICANT CHANGE UP (ref 3.5–5)
POTASSIUM SERPL-SCNC: 4.6 MMOL/L — SIGNIFICANT CHANGE UP (ref 3.5–5)
RBC # BLD: 3.7 M/UL — LOW (ref 4.2–5.4)
RBC # FLD: 16.7 % — HIGH (ref 11.5–14.5)
SODIUM SERPL-SCNC: 151 MMOL/L — HIGH (ref 135–146)
SODIUM SERPL-SCNC: 152 MMOL/L — HIGH (ref 135–146)
WBC # BLD: 10.77 K/UL — SIGNIFICANT CHANGE UP (ref 4.8–10.8)
WBC # FLD AUTO: 10.77 K/UL — SIGNIFICANT CHANGE UP (ref 4.8–10.8)

## 2020-01-20 PROCEDURE — 99233 SBSQ HOSP IP/OBS HIGH 50: CPT

## 2020-01-20 RX ORDER — SODIUM CHLORIDE 9 MG/ML
1000 INJECTION, SOLUTION INTRAVENOUS
Refills: 0 | Status: DISCONTINUED | OUTPATIENT
Start: 2020-01-20 | End: 2020-01-22

## 2020-01-20 RX ORDER — SODIUM CHLORIDE 9 MG/ML
1000 INJECTION, SOLUTION INTRAVENOUS
Refills: 0 | Status: DISCONTINUED | OUTPATIENT
Start: 2020-01-20 | End: 2020-01-20

## 2020-01-20 RX ORDER — NYSTATIN CREAM 100000 [USP'U]/G
1 CREAM TOPICAL
Refills: 0 | Status: DISCONTINUED | OUTPATIENT
Start: 2020-01-20 | End: 2020-01-22

## 2020-01-20 RX ADMIN — NYSTATIN CREAM 1 APPLICATION(S): 100000 CREAM TOPICAL at 06:17

## 2020-01-20 RX ADMIN — CHLORHEXIDINE GLUCONATE 1 APPLICATION(S): 213 SOLUTION TOPICAL at 05:04

## 2020-01-20 RX ADMIN — NYSTATIN CREAM 1 APPLICATION(S): 100000 CREAM TOPICAL at 18:57

## 2020-01-20 NOTE — PROGRESS NOTE ADULT - ASSESSMENT
89 year old with pmhx of Afib on Eliquis, diverticulosis, GERD, chronic Smith right eye blindness, presents from NH with respiratory distress for 2 days duration.     a/p:  # Acute hypoxic respiratory failure - severe sepsis present on admission.--suspected GNR pneumonia  -ct with bilateral pleural effusion--family refused thoracentesis and are deciding about GOC (consideration for comfort mesuares)---do not want any invasive procedures   -palliative care following  -cont iv abx (on merrem)  -bcx negative to date  -TTE with ? thickening of aortic valve cusp (cannot r/o BE) however as bcx negative will not pursue further w/u (no plan for ANA to r/o BE)  -monitor vs closely  -appreciate ID Recomm  -monitor wbc and fever curve  -RVP negative    # Metabolic Encephalopathy with underlying dementia >  - acute metab enceph from sepsis/ pneumonia--also complicated course at OSH including cholcysitits with cholecystostomy tube     #HFrEF- acute on chronic  -ECHO with EF 20-25%, enlarged LF, decreased RV systolic function, mod MVR, severe TVR and severe pulm htn- aortic vlve cusp thickening  -monitor daily weights and i/o  -cardiology following    #hypernatremia  -slowly improving (Na 152 this am)--repeat bmp daily  -monitor electrolytes----monitor fluid status  - ivf D51/2 NS @ 75 cc/hr (x8 hrs time period only 2/2 HFrEF adn concern for exacerbation    #NSTEMI type 2  -monitor bp and symptoms  -no current cp    #thrombocytopenia  -trend plts---today 44k--no signs of active bleeding but + diffuse generalized ecchymosis  -hit ab negative  -monitor cbc     # History afib on 2.5 mg Eliquis   - hold Eliquis  - c/w metoprolol 25 mg QD       # RITU on CKD III  -Cr downtrending  -Monitor BMP    #DVT/GI ppx  DNR/DNI  purree diet with aspiration precautions---f/u with sp swallow and PT/Rehab    #Progress Note Handoff  Disposition: Unknown at this time  palliatve care consult--family will decide about possible comfort measures---consider hospice consult if decide on comfort care only 89 year old with pmhx of Afib on Eliquis, diverticulosis, GERD, chronic Smith right eye blindness, presents from NH with respiratory distress for 2 days duration.     # Acute hypoxic respiratory failure  # Severe sepsis present on admission, resolved--suspected GNR pneumonia  - CT Chest: b/l pleural effusion  - Family refused thoracentesis and are deciding about GOC/consideration for comfort mesures (don't want invasive procedures)   - Palliative care following  - S/p Merren  -bcx negative to date  -TTE with ? thickening of aortic valve cusp (cannot r/o BE) however as bcx negative will not pursue further w/u (no plan for ANA to r/o BE)  -monitor vs closely  -appreciate ID Recomm  -monitor wbc and fever curve  -RVP negative    # Metabolic Encephalopathy with underlying dementia >  - acute metab enceph from sepsis/ pneumonia--also complicated course at OSH including cholcysitits with cholecystostomy tube     #HFrEF- acute on chronic  -ECHO with EF 20-25%, enlarged LF, decreased RV systolic function, mod MVR, severe TVR and severe pulm htn- aortic vlve cusp thickening  -monitor daily weights and i/o  -cardiology following    #hypernatremia  -slowly improving (Na 152 this am)--repeat bmp daily  -monitor electrolytes----monitor fluid status  - ivf D51/2 NS @ 75 cc/hr (x8 hrs time period only 2/2 HFrEF adn concern for exacerbation    #NSTEMI type 2  -monitor bp and symptoms  -no current cp    #thrombocytopenia  -trend plts---today 44k--no signs of active bleeding but + diffuse generalized ecchymosis  -hit ab negative  -monitor cbc     # History afib on 2.5 mg Eliquis   - hold Eliquis  - c/w metoprolol 25 mg QD       # RITU on CKD III  -Cr downtrending  -Monitor BMP    #DVT/GI ppx  DNR/DNI  purree diet with aspiration precautions---f/u with sp swallow and PT/Rehab    #Progress Note Handoff  Disposition: Unknown at this time  palliatve care consult--family will decide about possible comfort measures---consider hospice consult if decide on comfort care only 89 year old with pmhx of Afib on Eliquis, diverticulosis, GERD, chronic Smith right eye blindness, presents from NH with respiratory distress for 2 days duration.     # Acute hypoxic respiratory failure  # Severe sepsis present on admission, resolved--suspected GNR pneumonia  - CT Chest: b/l pleural effusion  - Family refused thoracentesis and are deciding about GOC/consideration for comfort mesures (don't want invasive procedures)   - Palliative care following  - S/p Merren  - Bl cx NTD  -TTE with ? thickening of aortic valve cusp (cannot r/o BE) however as bcx negative will not pursue further w/u (no plan for ANA to r/o BE)  -monitor vs closely  -appreciate ID Recomm  -monitor wbc and fever curve  -RVP negative    # Metabolic Encephalopathy with underlying dementia >  - acute metab enceph from sepsis/ pneumonia--also complicated course at OSH including cholcysitits with cholecystostomy tube     #HFrEF- acute on chronic  -ECHO with EF 20-25%, enlarged LF, decreased RV systolic function, mod MVR, severe TVR and severe pulm htn- aortic vlve cusp thickening  -monitor daily weights and i/o  -cardiology following    #hypernatremia  -slowly improving (Na 152 this am)--repeat bmp daily  -monitor electrolytes----monitor fluid status  - ivf D51/2 NS @ 75 cc/hr (x8 hrs time period only 2/2 HFrEF adn concern for exacerbation    #NSTEMI type 2  -monitor bp and symptoms  -no current cp    #thrombocytopenia  -trend plts---today 44k--no signs of active bleeding but + diffuse generalized ecchymosis  -hit ab negative  -monitor cbc     # History afib on 2.5 mg Eliquis   - hold Eliquis  - c/w metoprolol 25 mg QD       # RITU on CKD III  -Cr downtrending  -Monitor BMP    #DVT/GI ppx  DNR/DNI  purree diet with aspiration precautions---f/u with sp swallow and PT/Rehab    #Progress Note Handoff  Disposition: Unknown at this time  palliatve care consult--family will decide about possible comfort measures---consider hospice consult if decide on comfort care only 89 year old with pmhx of Afib on Eliquis, diverticulosis, GERD, chronic Smith right eye blindness, presents from NH with respiratory distress for 2 days duration.     # Acute hypoxic respiratory failure  # Severe sepsis present on admission, resolved--suspected GNR pneumonia  - CT Chest: b/l pleural effusion  - Family refused thoracentesis and are deciding about GOC/consideration for comfort mesures (don't want invasive procedures)   - Palliative care following  - S/p Merren  - Bl cx NTD, no WBC, afebrile   - RVP negative  -TTE 1/15: AV thickening, cannot r/o vegetations, howeve,r given negative blcx, will not pursue further w/u (no plan for ANA to r/o BE)  -ID following    # Metabolic Encephalopathy with underlying dementia  - Resolved  - Likely due to sepsis/PNA and complicated course at OSH including cholcysitits with cholecystostomy tube   - AAOx1, answering some questions appropriately    #HFrEF- acute on chronic  -ECHO with EF 20-25%, enlarged LF, decreased RV systolic function, mod MVR, severe TVR and severe pulm htn- aortic vlve cusp thickening  -monitor daily weights and i/o  -cardiology following    #hypernatremia  -slowly improving (Na 152 this am)--repeat bmp daily  -monitor electrolytes----monitor fluid status  - ivf D51/2 NS @ 75 cc/hr (x8 hrs time period only 2/2 HFrEF adn concern for exacerbation    #NSTEMI type 2  -monitor bp and symptoms  -no current cp    #thrombocytopenia  -trend plts---today 44k--no signs of active bleeding but + diffuse generalized ecchymosis  -hit ab negative  -monitor cbc     # History afib on 2.5 mg Eliquis   - hold Eliquis  - c/w metoprolol 25 mg QD       # RITU on CKD III  -Cr downtrending  -Monitor BMP    #DVT/GI ppx  DNR/DNI  purree diet with aspiration precautions---f/u with sp swallow and PT/Rehab    #Progress Note Handoff  Disposition: Unknown at this time  palliatve care consult--family will decide about possible comfort measures---consider hospice consult if decide on comfort care only 89 year old with pmhx of Afib on Eliquis, diverticulosis, GERD, chronic Smith right eye blindness, presents from NH with respiratory distress for 2 days duration.     # Acute hypoxic respiratory failure  # Severe sepsis present on admission, resolved--suspected GNR pneumonia  - CT Chest: b/l pleural effusion  - Family refused thoracentesis and are deciding about GOC/consideration for comfort measures (don't want invasive procedures)   - Bl cx NTD, no WBC, afebrile, S/p Merren   - RVP negative  - TTE 1/15: AV thickening, cannot r/o vegetations, however, given negative blcx, will not pursue further w/u (no plan for ANA to r/o BE)  - PT/Rehab c/s: not a candidate for PT/Rehab  - ID following  - Palliative care following    # Thrombocytopenia  - 60 <- 44, no signs of active bleeding  - HIT ab negative  - monitor cbc     # Hypernatremia  - Improving, 152 <- 152  - C/w Dextrose 5% @50mls  - Monitor fluids status, pt HFrEF  - F/u Daily BMP    # Acute Metabolic Encephalopathy with underlying dementia  - Resolved  - Likely due to sepsis/PNA and complicated course at OSH including cholecysistitis with cholecystostomy tube   - AAOx1, answering some questions appropriately, decreased PO    #HFrEF- acute on chronic  -ECHO with EF 20-25%, enlarged LF, decreased RV systolic function, mod MVR, severe TVR and severe pulm htn- aortic valve cusp thickening  -monitor daily weights and i/o    #NSTEMI type 2  - BP wnl, no c/p  - C/w Metoprolol 25mg PO QD     # History afib on 2.5 mg Eliquis   - hold Eliquis  - c/w metoprolol 25 mg QD     # RITU on CKD III  - Resolved     DVT ppx: Sequentials   GI ppx: None  Code: DNR/DNI  Diet: Dysphagia 1   Dispo: pending family decision, unknown at this time 89 year old with pmhx of Afib on Eliquis, diverticulosis, GERD, chronic Smith right eye blindness, presents from NH with respiratory distress for 2 days duration.     # Acute hypoxic respiratory failure  # Severe sepsis present on admission, resolved--suspected GNR pneumonia  - CT Chest: b/l pleural effusion  - Family refused thoracentesis and are deciding about GOC/consideration for comfort measures (don't want invasive procedures)   - Bl cx NTD, no WBC, afebrile, S/p Merren   - RVP negative  - TTE 1/15: AV thickening, cannot r/o vegetations, however, given negative Bl cx, will not pursue further w/u (no plan for ANA to r/o BE)  - PT/Rehab c/s: not a candidate for PT/Rehab  - ID following  - Palliative care following    # S/p Cholecystitis with colostomy tube   - Daily drainage by nursing     # Thrombocytopenia  - 60 <- 44, no signs of active bleeding  - HIT ab negative  - monitor cbc     # Hypernatremia  - Improving, 152 <- 152  - C/w Dextrose 5% @50mls  - Monitor fluids status, pt HFrEF  - F/u Daily BMP    # Acute Metabolic Encephalopathy with underlying dementia  - Resolved  - Likely due to sepsis/PNA and complicated course at OSH including cholecysistitis with cholecystostomy tube   - AAOx1, answering some questions appropriately, decreased PO    #HFrEF- acute on chronic  -ECHO with EF 20-25%, enlarged LF, decreased RV systolic function, mod MVR, severe TVR and severe pulm htn- aortic valve cusp thickening  -monitor daily weights and i/o    #NSTEMI type 2  - BP wnl, no c/p  - C/w Metoprolol 25mg PO QD     # History afib on 2.5 mg Eliquis   - hold Eliquis  - c/w metoprolol 25 mg QD     # RITU on CKD III  - Resolved     DVT ppx: Sequentials   GI ppx: None  Code: DNR/DNI  Diet: Dysphagia 1   Dispo: pending family decision, unknown at this time

## 2020-01-20 NOTE — PROGRESS NOTE ADULT - SUBJECTIVE AND OBJECTIVE BOX
Hospital Day:  8d    Subjective:    Patient is a 89y old  Female who presents with a chief complaint of respiratory distress (19 Jan 2020 14:30)      Past Medical Hx:   Afib    Past Sx:  Cholecystostomy drain infection    Allergies:  penicillins (Other (U))  sulfonamides (Other (U))    Current Meds:   Standng Meds:  chlorhexidine 4% Liquid 1 Application(s) Topical <User Schedule>  dextrose 5%. 1000 milliLiter(s) (100 mL/Hr) IV Continuous <Continuous>  enoxaparin Injectable 40 milliGRAM(s) SubCutaneous at bedtime  metoprolol succinate ER 25 milliGRAM(s) Oral daily  nystatin Powder 1 Application(s) Topical two times a day    PRN Meds:  morphine  - Injectable 0.5 milliGRAM(s) IV Push every 4 hours PRN Moderate Pain (4 - 6)    HOME MEDICATIONS:  aztreonam 500 mg injection:   Eliquis 2.5 mg oral tablet: 1 tab(s) orally 2 times a day  Flagyl 500 mg oral tablet: 1 tab(s) orally 3 times a day  Lasix 20 mg oral tablet: 1 tab(s) orally once a day  Zyvox 2 mg/mL-D5% intravenous solution:       Vital Signs:   T(F): 96.6 (01-19-20 @ 23:37), Max: 96.6 (01-19-20 @ 23:37)  HR: 129 (01-19-20 @ 23:37) (117 - 129)  BP: 125/76 (01-19-20 @ 23:37) (125/76 - 144/87)  RR: 20 (01-19-20 @ 23:37) (20 - 20)  SpO2: --      01-19-20 @ 07:01  -  01-20-20 @ 07:00  --------------------------------------------------------  IN: 800 mL / OUT: 150 mL / NET: 650 mL        Physical Exam:   GENERAL: NAD  HEENT: NCAT  CHEST/LUNG: CTAB  HEART: Regular rate and rhythm; s1 s2 appreciated, No murmurs, rubs, or gallops  ABDOMEN: Soft, Nontender, Nondistended; Bowel sounds present  EXTREMITIES: No LE edema b/l  NERVOUS SYSTEM:  Alert & Oriented X3        Labs:                         11.1   11.64 )-----------( 44       ( 19 Jan 2020 05:35 )             35.5       19 Jan 2020 05:35    152    |  117    |  49     ----------------------------<  147    4.5     |  24     |  0.9      Ca    8.5        19 Jan 2020 05:35    TPro  5.1    /  Alb  3.0    /  TBili  1.0    /  DBili  x      /  AST  33     /  ALT  57     /  AlkPhos  64     19 Jan 2020 05:35            Serum Pro-Brain Natriuretic Peptide: 26968 pg/mL (01-13-20 @ 00:37)          Culture - Blood (collected 01-13-20 @ 11:26)  Source: .Blood None  Final Report (01-18-20 @ 23:01):    No growth at 5 days. Hospital Day:  8d    Subjective:    Patient is a 89y old  Female who presents with a chief complaint of respiratory distress. No acute events overnight and in no distress this am.     Admitted for       Past Medical Hx:   Afib    Past Sx:  Cholecystostomy drain infection    Allergies:  penicillins (Other (U))  sulfonamides (Other (U))    Current Meds:   Standng Meds:  chlorhexidine 4% Liquid 1 Application(s) Topical <User Schedule>  dextrose 5%. 1000 milliLiter(s) (100 mL/Hr) IV Continuous <Continuous>  enoxaparin Injectable 40 milliGRAM(s) SubCutaneous at bedtime  metoprolol succinate ER 25 milliGRAM(s) Oral daily  nystatin Powder 1 Application(s) Topical two times a day    PRN Meds:  morphine  - Injectable 0.5 milliGRAM(s) IV Push every 4 hours PRN Moderate Pain (4 - 6)    HOME MEDICATIONS:  aztreonam 500 mg injection:   Eliquis 2.5 mg oral tablet: 1 tab(s) orally 2 times a day  Flagyl 500 mg oral tablet: 1 tab(s) orally 3 times a day  Lasix 20 mg oral tablet: 1 tab(s) orally once a day  Zyvox 2 mg/mL-D5% intravenous solution:       Vital Signs:   T(F): 96.6 (01-19-20 @ 23:37), Max: 96.6 (01-19-20 @ 23:37)  HR: 129 (01-19-20 @ 23:37) (117 - 129)  BP: 125/76 (01-19-20 @ 23:37) (125/76 - 144/87)  RR: 20 (01-19-20 @ 23:37) (20 - 20)  SpO2: --      01-19-20 @ 07:01  -  01-20-20 @ 07:00  --------------------------------------------------------  IN: 800 mL / OUT: 150 mL / NET: 650 mL        Physical Exam:   GENERAL: NAD  HEENT: NCAT  CHEST/LUNG: CTAB  HEART: Regular rate and rhythm; s1 s2 appreciated, No murmurs, rubs, or gallops  ABDOMEN: Soft, Nontender, Nondistended; Bowel sounds present  EXTREMITIES: No LE edema b/l  NERVOUS SYSTEM:  Alert & Oriented X3        Labs:                         11.1   11.64 )-----------( 44       ( 19 Jan 2020 05:35 )             35.5       19 Jan 2020 05:35    152    |  117    |  49     ----------------------------<  147    4.5     |  24     |  0.9      Ca    8.5        19 Jan 2020 05:35    TPro  5.1    /  Alb  3.0    /  TBili  1.0    /  DBili  x      /  AST  33     /  ALT  57     /  AlkPhos  64     19 Jan 2020 05:35            Serum Pro-Brain Natriuretic Peptide: 67419 pg/mL (01-13-20 @ 00:37)          Culture - Blood (collected 01-13-20 @ 11:26)  Source: .Blood None  Final Report (01-18-20 @ 23:01):    No growth at 5 days. Hospital Day:  8d    Subjective:    Patient is a 89y old  Female who presents with a chief complaint of respiratory distress. No acute events overnight and in no distress this am. AAOx1 (name).     Admitted for respiratory failure due to PNA      Past Medical Hx:   Afib    Past Sx:  Cholecystostomy drain infection    Allergies:  penicillins (Other (U))  sulfonamides (Other (U))    Current Meds:   Standng Meds:  chlorhexidine 4% Liquid 1 Application(s) Topical <User Schedule>  dextrose 5%. 1000 milliLiter(s) (100 mL/Hr) IV Continuous <Continuous>  enoxaparin Injectable 40 milliGRAM(s) SubCutaneous at bedtime  metoprolol succinate ER 25 milliGRAM(s) Oral daily  nystatin Powder 1 Application(s) Topical two times a day    PRN Meds:  morphine  - Injectable 0.5 milliGRAM(s) IV Push every 4 hours PRN Moderate Pain (4 - 6)    HOME MEDICATIONS:  aztreonam 500 mg injection:   Eliquis 2.5 mg oral tablet: 1 tab(s) orally 2 times a day  Flagyl 500 mg oral tablet: 1 tab(s) orally 3 times a day  Lasix 20 mg oral tablet: 1 tab(s) orally once a day  Zyvox 2 mg/mL-D5% intravenous solution:       Vital Signs:   T(F): 96.6 (01-19-20 @ 23:37), Max: 96.6 (01-19-20 @ 23:37)  HR: 129 (01-19-20 @ 23:37) (117 - 129)  BP: 125/76 (01-19-20 @ 23:37) (125/76 - 144/87)  RR: 20 (01-19-20 @ 23:37) (20 - 20)  SpO2: --      01-19-20 @ 07:01  -  01-20-20 @ 07:00  --------------------------------------------------------  IN: 800 mL / OUT: 150 mL / NET: 650 mL        Physical Exam:   GENERAL: NAD  HEENT: NCAT  CHEST/LUNG: CTAB  HEART: Regular rate and rhythm; s1 s2 appreciated, No murmurs, rubs, or gallops  ABDOMEN: Soft, Nontender, Nondistended; Bowel sounds present  EXTREMITIES: No LE edema b/l  NERVOUS SYSTEM:  Alert & Oriented X1        Labs:                         11.1   11.64 )-----------( 44       ( 19 Jan 2020 05:35 )             35.5       19 Jan 2020 05:35    152    |  117    |  49     ----------------------------<  147    4.5     |  24     |  0.9      Ca    8.5        19 Jan 2020 05:35    TPro  5.1    /  Alb  3.0    /  TBili  1.0    /  DBili  x      /  AST  33     /  ALT  57     /  AlkPhos  64     19 Jan 2020 05:35            Serum Pro-Brain Natriuretic Peptide: 73065 pg/mL (01-13-20 @ 00:37)          Culture - Blood (collected 01-13-20 @ 11:26)  Source: .Blood None  Final Report (01-18-20 @ 23:01):    No growth at 5 days.

## 2020-01-21 LAB
ANION GAP SERPL CALC-SCNC: 13 MMOL/L — SIGNIFICANT CHANGE UP (ref 7–14)
BASOPHILS # BLD AUTO: 0.02 K/UL — SIGNIFICANT CHANGE UP (ref 0–0.2)
BASOPHILS NFR BLD AUTO: 0.2 % — SIGNIFICANT CHANGE UP (ref 0–1)
BUN SERPL-MCNC: 45 MG/DL — HIGH (ref 10–20)
CALCIUM SERPL-MCNC: 8.5 MG/DL — SIGNIFICANT CHANGE UP (ref 8.5–10.1)
CHLORIDE SERPL-SCNC: 119 MMOL/L — HIGH (ref 98–110)
CO2 SERPL-SCNC: 23 MMOL/L — SIGNIFICANT CHANGE UP (ref 17–32)
CREAT SERPL-MCNC: 0.8 MG/DL — SIGNIFICANT CHANGE UP (ref 0.7–1.5)
EOSINOPHIL # BLD AUTO: 0.01 K/UL — SIGNIFICANT CHANGE UP (ref 0–0.7)
EOSINOPHIL NFR BLD AUTO: 0.1 % — SIGNIFICANT CHANGE UP (ref 0–8)
GLUCOSE SERPL-MCNC: 110 MG/DL — HIGH (ref 70–99)
HCT VFR BLD CALC: 33.4 % — LOW (ref 37–47)
HGB BLD-MCNC: 11.1 G/DL — LOW (ref 12–16)
IMM GRANULOCYTES NFR BLD AUTO: 1.1 % — HIGH (ref 0.1–0.3)
LYMPHOCYTES # BLD AUTO: 1.31 K/UL — SIGNIFICANT CHANGE UP (ref 1.2–3.4)
LYMPHOCYTES # BLD AUTO: 14.6 % — LOW (ref 20.5–51.1)
MAGNESIUM SERPL-MCNC: 2.1 MG/DL — SIGNIFICANT CHANGE UP (ref 1.8–2.4)
MCHC RBC-ENTMCNC: 32.5 PG — HIGH (ref 27–31)
MCHC RBC-ENTMCNC: 33.2 G/DL — SIGNIFICANT CHANGE UP (ref 32–37)
MCV RBC AUTO: 97.7 FL — SIGNIFICANT CHANGE UP (ref 81–99)
MONOCYTES # BLD AUTO: 0.83 K/UL — HIGH (ref 0.1–0.6)
MONOCYTES NFR BLD AUTO: 9.3 % — SIGNIFICANT CHANGE UP (ref 1.7–9.3)
NEUTROPHILS # BLD AUTO: 6.7 K/UL — HIGH (ref 1.4–6.5)
NEUTROPHILS NFR BLD AUTO: 74.7 % — SIGNIFICANT CHANGE UP (ref 42.2–75.2)
NRBC # BLD: 1 /100 WBCS — HIGH (ref 0–0)
PLATELET # BLD AUTO: 45 K/UL — LOW (ref 130–400)
POTASSIUM SERPL-MCNC: 4.7 MMOL/L — SIGNIFICANT CHANGE UP (ref 3.5–5)
POTASSIUM SERPL-SCNC: 4.7 MMOL/L — SIGNIFICANT CHANGE UP (ref 3.5–5)
RBC # BLD: 3.42 M/UL — LOW (ref 4.2–5.4)
RBC # FLD: 16.5 % — HIGH (ref 11.5–14.5)
SODIUM SERPL-SCNC: 155 MMOL/L — HIGH (ref 135–146)
WBC # BLD: 8.97 K/UL — SIGNIFICANT CHANGE UP (ref 4.8–10.8)
WBC # FLD AUTO: 8.97 K/UL — SIGNIFICANT CHANGE UP (ref 4.8–10.8)

## 2020-01-21 PROCEDURE — 99231 SBSQ HOSP IP/OBS SF/LOW 25: CPT

## 2020-01-21 PROCEDURE — 99497 ADVNCD CARE PLAN 30 MIN: CPT

## 2020-01-21 PROCEDURE — 99232 SBSQ HOSP IP/OBS MODERATE 35: CPT

## 2020-01-21 RX ORDER — MIDODRINE HYDROCHLORIDE 2.5 MG/1
10 TABLET ORAL ONCE
Refills: 0 | Status: COMPLETED | OUTPATIENT
Start: 2020-01-21 | End: 2020-01-21

## 2020-01-21 RX ADMIN — NYSTATIN CREAM 1 APPLICATION(S): 100000 CREAM TOPICAL at 18:13

## 2020-01-21 RX ADMIN — NYSTATIN CREAM 1 APPLICATION(S): 100000 CREAM TOPICAL at 05:19

## 2020-01-21 RX ADMIN — MORPHINE SULFATE 0.5 MILLIGRAM(S): 50 CAPSULE, EXTENDED RELEASE ORAL at 23:14

## 2020-01-21 RX ADMIN — Medication 25 MILLIGRAM(S): at 05:18

## 2020-01-21 RX ADMIN — SODIUM CHLORIDE 50 MILLILITER(S): 9 INJECTION, SOLUTION INTRAVENOUS at 18:13

## 2020-01-21 RX ADMIN — CHLORHEXIDINE GLUCONATE 1 APPLICATION(S): 213 SOLUTION TOPICAL at 05:18

## 2020-01-21 RX ADMIN — MORPHINE SULFATE 0.5 MILLIGRAM(S): 50 CAPSULE, EXTENDED RELEASE ORAL at 22:58

## 2020-01-21 NOTE — PROGRESS NOTE ADULT - SUBJECTIVE AND OBJECTIVE BOX
89yFemale with diagnosis: SEPSIS RITU FEVER    No overnight events.   Patient seen, son - Rashel and boyfriend at bedside. She is enjoying shake brought from home. No complaints of pain/dyspnea/agitation today.      PHYSICAL EXAM  Alert and responding minimally to family - enjoying shake by spoonfuls - total feed; no coughing  Breathing easy and unlabored 20-24; no accessory use      T(C): , Max: 36.4 (07:50)  T(F): 97.6  HR: 144 (105 - 144)  BP: 101/67 (101/67 - 103/73)  RR: 20 (20 - 20)  SpO2: --        LABS:                          11.1   8.97  )-----------( 45       ( 21 Jan 2020 05:30 )             33.4                                                                                      01-21    155<H>  |  119<H>  |  45<H>  ----------------------------<  110<H>  4.7   |  23  |  0.8    Ca    8.5      21 Jan 2020 05:30  Mg     2.1     01-21                                                        MEDICATIONS  (STANDING):  chlorhexidine 4% Liquid 1 Application(s) Topical <User Schedule>  dextrose 5%. 1000 milliLiter(s) (50 mL/Hr) IV Continuous <Continuous>  metoprolol succinate ER 25 milliGRAM(s) Oral daily  nystatin Powder 1 Application(s) Topical two times a day    MEDICATIONS  (PRN):  morphine  - Injectable 0.5 milliGRAM(s) IV Push every 4 hours PRN Moderate Pain (4 - 6)

## 2020-01-21 NOTE — CHART NOTE - NSCHARTNOTEFT_GEN_A_CORE
Registered Dietitian Follow-Up     Patient Profile Reviewed                           Yes [x]   No []     Nutrition History Previously Obtained        Yes [x]  No []       Pertinent Medical Interventions: Persistent debility/weakness following severe illness and respiratory failure. Cognitive disorder; worsening due to underlying dementia. Patient's family members considering options after Goals of Care Conversation with palliative care team, however no decision made as of yet. Spoke with Dr. Bernal regarding potential nutrition support, however waiting on family as goals of care not clear; family did mention they didn't want invasive measures. LIP aware of poor po intake and will f/u with family regarding artifical means of nutrition.      Diet order: Dysphagia 1 Pureed diet with nectar consistency fluids--spoke with pt's RN who reports that pt is pocketing food at times and takes a really long time to eat; currently consuming <25% of all meal trays.      Anthropometrics:  - Ht. 152.4 cm.  - Wt. no new wt at this time  - BMI 29.9 (using previous wt 69.4 kg 1/13)  - IBW 45.4 kg.     Pertinent Lab Data: (1/21): H/H 11.1/33.4, Na 155, BUN 45, Gluc 110      Pertinent Meds: D5W @50ml/hr, morphine, metoprolol      Physical Findings:  - Appearance: confused, disoriented; 2+ generalized edema noted   - GI function: LBM 1/20  - Oral/Mouth cavity: Per 1/17 SLP eval - mild oral dysphagia for puree and nectar thick liquids w/o overt s/s penetration/aspiration. Recommends Dysphagia 1 (puree) w/ nectar thick liquids.  - Skin: ecchymosis     Nutrition Requirements  Weight Used: 69.4 kg ABW per 1/15 RD assessment     Estimated Energy Needs    Continue [x]  Adjust []  3826-7199 (20-25 kcal/kg ABW)     Estimated Protein Needs    Continue [x]  Adjust []  42-56 g/day (0.6-0.8 g/kg ABW) - used this range as BMI is borderline obese + pt with RITU on CKD     Estimated Fluid Needs        Continue [x]  Adjust []  per LIP     Nutrient Intake: not meeting kcal/pro needs at this time      Previous Nutrition Diagnosis: Inadequate energy intake (ongoing)        Nutrition Intervention: meals and snacks, medical food supplements, collaboration/referral of care     Recommendations:  1. F/U with SLP; diet order to be consistent with SLP recs. If pt remains on pureed diet, please activate pending diet order from 1/18.   2. Pending family decision; if enteral nutrition aligns with GOC, will provide TF recs upon f/u.      Goal/Expected Outcome: Pt to consume >25% of meals, snacks, and supplements within 3 days      Indicator/Monitoring: RD to monitor energy intake, diet order, glucose profile, renal profile, nutrition focused physical findings, body composition

## 2020-01-21 NOTE — PROGRESS NOTE ADULT - ASSESSMENT
· Assessment	  89 year old with pmhx of Afib on Eliquis, diverticulosis, GERD, chronic Smith right eye blindness, presents from NH with respiratory distress for 2 days duration.     # Acute hypoxic respiratory failure  # Severe sepsis present on admission, resolved--suspected GNR pneumonia  - CT Chest: b/l pleural effusion  - Family refused thoracentesis and are deciding about GOC/consideration for comfort measures (don't want invasive procedures)   - Bl cx NTD, no WBC, afebrile, S/p Merren   - RVP negative  - TTE 1/15: AV thickening, cannot r/o vegetations, however, given negative Bl cx, will not pursue further w/u (no plan for ANA to r/o BE)  - PT/Rehab c/s: not a candidate for PT/Rehab  - ID following  - Palliative care following    # S/p Cholecystitis with colostomy tube   - Daily drainage by nursing     # Thrombocytopenia  - ___<- 60 <- 44, no signs of active bleeding  - HIT ab negative  - monitor cbc     # Hypernatremia  - Improving, ___<- 152 <- 152  - C/w Dextrose 5% @50mls  - Monitor fluids status, pt HFrEF  - F/u Daily BMP    # Acute Metabolic Encephalopathy with underlying dementia  - Resolved  - Likely due to sepsis/PNA and complicated course at OSH including cholecysistitis with cholecystostomy tube   - AAOx1, answering some questions appropriately, decreased PO    #HFrEF- acute on chronic  -ECHO with EF 20-25%, enlarged LF, decreased RV systolic function, mod MVR, severe TVR and severe pulm htn- aortic valve cusp thickening  -monitor daily weights and i/o    #NSTEMI type 2  - BP wnl, no c/p  - C/w Metoprolol 25mg PO QD     # History afib on 2.5 mg Eliquis   - hold Eliquis  - c/w metoprolol 25 mg QD     # RITU on CKD III  - Resolved     DVT ppx: Sequentials   GI ppx: None  Code: DNR/DNI  Diet: Dysphagia 1   Dispo: pending family decision, unknown at this time · Assessment	  89 year old with pmhx of Afib on Eliquis, diverticulosis, GERD, chronic Smith right eye blindness, presents from NH with respiratory distress for 2 days duration.     # Acute hypoxic respiratory failure  # Severe sepsis present on admission, resolved--suspected GNR pneumonia  - CT Chest: b/l pleural effusion  - Family refused thoracentesis and are deciding about GOC/consideration for comfort measures (don't want invasive procedures)   - Bl cx NTD, no WBC, afebrile, S/p Merren   - RVP negative  - TTE 1/15: AV thickening, cannot r/o vegetations, however, given negative Bl cx, will not pursue further w/u (no plan for ANA to r/o BE)  - PT/Rehab c/s: not a candidate for PT/Rehab  - ID following  - Palliative care following    # S/p Cholecystitis with colostomy tube   - Daily drainage by nursing     # Thrombocytopenia  - ___<- 60 <- 44, no signs of active bleeding  - HIT ab negative  - monitor cbc     # Hypernatremia  - Improving, ___<- 152 <- 152  - C/w Dextrose 5% @50mls  - Monitor fluids status, pt HFrEF  - F/u Daily BMP    # Acute Metabolic Encephalopathy with underlying dementia  - Resolved  - Likely due to sepsis/PNA and complicated course at OSH including cholecysistitis with cholecystostomy tube   - AAOx1, answering some questions appropriately, decreased PO    #HFrEF-  chronic  -ECHO with EF 20-25%, enlarged LF, decreased RV systolic function, mod MVR, severe TVR and severe pulm htn- aortic valve cusp thickening  -monitor daily weights and i/o    #NSTEMI type 2  - BP wnl, no c/p  - C/w Metoprolol 25mg PO QD     # History afib on 2.5 mg Eliquis   - hold Eliquis  - c/w metoprolol 25 mg QD     # RITU on CKD III  - Resolved     DVT ppx: Sequentials   GI ppx: None  Code: DNR/DNI  Diet: Dysphagia 1   Dispo: pending family decision, unknown at this time 89 year old with pmhx of Afib on Eliquis, diverticulosis, GERD, chronic Smith right eye blindness, presents from NH with respiratory distress for 2 days duration.     # Acute hypoxic respiratory failure  # Severe sepsis present on admission, resolved--suspected GNR pneumonia  - CT Chest: b/l pleural effusion  - Family refused thoracentesis and are deciding about GOC/consideration for comfort measures (don't want invasive procedures)   - Bl cx NTD, no WBC, afebrile, S/p Merren   - RVP negative  - TTE 1/15: AV thickening, cannot r/o vegetations, however, given negative Bl cx, will not pursue further w/u (no plan for ANA to r/o BE)  - PT/Rehab c/s: not a candidate for PT/Rehab  - ID following  - Palliative care following    # S/p Cholecystitis with colostomy tube   - Daily drainage by nursing     # Thrombocytopenia  - 45<- 60 <- 44, no signs of active bleeding  - HIT ab negative  - Monitor cbc     # Hypernatremia  - 155 <- 152 <- 152  - C/w Dextrose 5% @50mls  - Monitor fluids status, pt HFrEF  - F/u Daily BMP    # Acute Metabolic Encephalopathy with underlying dementia  - Improving   - Likely due to sepsis/PNA and complicated course at OSH including cholecysistitis with cholecystostomy tube   - AAOx1, answering some questions appropriately, decreased PO    #HFrEF-  chronic  -ECHO with EF 20-25%, enlarged LF, decreased RV systolic function, mod MVR, severe TVR and severe pulm htn- aortic valve cusp thickening  -monitor daily weights and i/o    #NSTEMI type 2  - BP wnl, no c/p  - C/w Metoprolol 25mg PO QD     # History afib on 2.5 mg Eliquis   - CHADVASC   - hold Eliquis  - c/w metoprolol 25 mg QD     # RITU on CKD III  - Resolved     DVT ppx: Sequentials   GI ppx: None  Code: DNR/DNI  Diet: Dysphagia 1   Dispo: pending family decision, unknown at this time 89 year old with pmhx of Afib on Eliquis, diverticulosis, GERD, chronic Smith right eye blindness, presents from NH with respiratory distress for 2 days duration.     # Acute hypoxic respiratory failure  # Severe sepsis present on admission, resolved--suspected GNR pneumonia  - CT Chest: b/l pleural effusion  - Family refused thoracentesis and are deciding about GOC/consideration for comfort measures (don't want invasive procedures)   - Bl cx NTD, no WBC, afebrile, S/p Merren   - RVP negative  - TTE 1/15: AV thickening, cannot r/o vegetations, however, given negative Bl cx, will not pursue further w/u (no plan for ANA to r/o BE)  - PT/Rehab c/s: not a candidate for PT/Rehab  - ID following  - Palliative care following    # S/p Cholecystitis with colostomy tube   - Daily drainage by nursing     # Thrombocytopenia  - 45<- 60 <- 44, no signs of active bleeding  - HIT ab negative  - Monitor cbc     # Hypernatremia  - 155 <- 152 <- 152  - C/w Dextrose 5% @50mls  - Monitor fluids status, pt HFrEF  - F/u Daily BMP    # Acute Metabolic Encephalopathy with underlying dementia  - Improving   - Likely due to sepsis/PNA and complicated course at OSH including cholecystitis with cholecystostomy tube   - AAOx1, answering some questions appropriately    #Malnutrition  - Marked decreased PO   - Encourage oral feeds    #HFrEF-  chronic  - ECHO with EF 20-25%, enlarged LF, decreased RV systolic function, mod MVR, severe TVR and severe pulm htn- aortic valve cusp thickening  - monitor daily weights and i/o    #NSTEMI type 2  - BP wnl, no c/p  - C/w Metoprolol 25mg PO QD     # A. Fib on 2.5 mg Eliquis   - CHADSVASC 5  - Rate controlled   - Holding Eliquis, marked b/l hand ecchymosis  - c/w metoprolol 25 mg QD     # RITU on CKD III  - Resolved     DVT ppx: Sequentials   GI ppx: None  Code: DNR/DNI  Diet: Dysphagia 1   Dispo: pending family decision, unknown at this time

## 2020-01-21 NOTE — PROGRESS NOTE ADULT - ATTENDING COMMENTS
Patient seen and examined independently. Agree with resident note.  #Encephalopathic sec to underlying metabolic condition.  # B/l pleural effusions completed RX with ABX.  # Ac systolic CHF-- cannot get further lasix due to hypernatremia  #Hypernatremia-- dextrose 5% correction to be done slowly-- changed rate to 50cc/hr.  # Ac cholecystitis with cholecystectomy bag draining copiously.  # ecchymosis on arms-- would not give her lovenox, but will discuss with family  # Hx of A fib not on AC  Sons will come in today--- they will decide about hospice care.
pending: clinical improvement, cultures    Dw son ( Cas)   DNI, DNR, antibiotics and procedures are ok.
Patient is seen and examined independently.  I have fully participated in the care of this patient.  I have reviewed all pertinent clinical information, including history, physical exam, plan and note.   I have reviewed all pertinent clinical information and reviewed all relevant imaging and diagnostic studies personally. I agree with resident note above and plan of care -edited and corrected where applicable- with addition:    Vitals:  T(F): 97.6 (01-21-20 @ 07:50)  HR: 144 (01-21-20 @ 07:50)  BP: 101/67 (01-21-20 @ 07:50)  RR: 20 (01-21-20 @ 07:50)    TESTS & MEASUREMENTS:                        11.1   8.97  )-----------( 45       ( 21 Jan 2020 05:30 )             33.4       01-21    155<H>  |  119<H>  |  45<H>  ----------------------------<  110<H>  4.7   |  23  |  0.8    Ca    8.5      21 Jan 2020 05:30  Mg     2.1     01-21      In summary:  HEALTH ISSUES - PROBLEM Dx:    .. Persistent debility/weakness following severe illness and respiratory failure  .. cognitive disorder; worsening due to underlying dementia  .. s/p cholecystostomy tube (cholecystitis)  .. CAD/ AFib; off anticoagulant therapy due to thrombocytopenia    PLAN  .. supportive care  .. Patient's family members considering options after Goals of Care Conversation with palliative care team   .. Long-term survival not expected due to overall severe illness and underlying frailty    discussed with resident assigned
Septic on admission.   IV meropenem now  F/ up TTE.   Blood culture negative so far.     D/W Son / HCP and updated patient conditions.

## 2020-01-21 NOTE — PROGRESS NOTE ADULT - REASON FOR ADMISSION
respiratory distress

## 2020-01-21 NOTE — PROGRESS NOTE ADULT - SUBJECTIVE AND OBJECTIVE BOX
Hospital Day:  9d    Subjective:    Patient is a 89y old  Female who presents with a chief complaint of respiratory distress (20 Jan 2020 07:51)      Past Medical Hx:   Afib    Past Sx:  Cholecystostomy drain infection    Allergies:  penicillins (Other (U))  sulfonamides (Other (U))    Current Meds:   Standng Meds:  chlorhexidine 4% Liquid 1 Application(s) Topical <User Schedule>  dextrose 5%. 1000 milliLiter(s) (50 mL/Hr) IV Continuous <Continuous>  metoprolol succinate ER 25 milliGRAM(s) Oral daily  nystatin Powder 1 Application(s) Topical two times a day    PRN Meds:  morphine  - Injectable 0.5 milliGRAM(s) IV Push every 4 hours PRN Moderate Pain (4 - 6)    HOME MEDICATIONS:  aztreonam 500 mg injection:   Eliquis 2.5 mg oral tablet: 1 tab(s) orally 2 times a day  Flagyl 500 mg oral tablet: 1 tab(s) orally 3 times a day  Lasix 20 mg oral tablet: 1 tab(s) orally once a day  Zyvox 2 mg/mL-D5% intravenous solution:       Vital Signs:   T(F): 96.2 (01-20-20 @ 23:30), Max: 96.9 (01-20-20 @ 07:52)  HR: 105 (01-20-20 @ 23:30) (105 - 148)  BP: 103/73 (01-20-20 @ 23:30) (103/73 - 136/73)  RR: 20 (01-20-20 @ 23:30) (18 - 20)  SpO2: --      01-20-20 @ 07:01  -  01-21-20 @ 07:00  --------------------------------------------------------  IN: 600 mL / OUT: 300 mL / NET: 300 mL        Physical Exam:   GENERAL: NAD  HEENT: NCAT  CHEST/LUNG: CTAB  HEART: Regular rate and rhythm; s1 s2 appreciated, No murmurs, rubs, or gallops  ABDOMEN: Soft, Nontender, Nondistended; Bowel sounds present  EXTREMITIES: No LE edema b/l  NERVOUS SYSTEM:  Alert & Oriented X3        Labs:                         11.5   10.77 )-----------( 60       ( 20 Jan 2020 07:19 )             36.4       21 Jan 2020 05:30    155    |  119    |  45     ----------------------------<  110    4.7     |  23     |  0.8      Ca    8.5        21 Jan 2020 05:30  Mg     2.1       21 Jan 2020 05:30              Serum Pro-Brain Natriuretic Peptide: 33807 pg/mL (01-13-20 @ 00:37) Hospital Day:  9d    Subjective:    Patient is a 89y old  Female who presents with a chief complaint of respiratory distress. No acute events overnight and in no distress this am. AAOx 1 (name), trying to answer question, mumbles, makes good eye contact.     Admitted for severe sepsis       Past Medical Hx:   Afib    Past Sx:  Cholecystostomy drain infection    Allergies:  penicillins (Other (U))  sulfonamides (Other (U))    Current Meds:   Standng Meds:  chlorhexidine 4% Liquid 1 Application(s) Topical <User Schedule>  dextrose 5%. 1000 milliLiter(s) (50 mL/Hr) IV Continuous <Continuous>  metoprolol succinate ER 25 milliGRAM(s) Oral daily  nystatin Powder 1 Application(s) Topical two times a day    PRN Meds:  morphine  - Injectable 0.5 milliGRAM(s) IV Push every 4 hours PRN Moderate Pain (4 - 6)    HOME MEDICATIONS:  aztreonam 500 mg injection:   Eliquis 2.5 mg oral tablet: 1 tab(s) orally 2 times a day  Flagyl 500 mg oral tablet: 1 tab(s) orally 3 times a day  Lasix 20 mg oral tablet: 1 tab(s) orally once a day  Zyvox 2 mg/mL-D5% intravenous solution:       Vital Signs:   T(F): 96.2 (01-20-20 @ 23:30), Max: 96.9 (01-20-20 @ 07:52)  HR: 105 (01-20-20 @ 23:30) (105 - 148)  BP: 103/73 (01-20-20 @ 23:30) (103/73 - 136/73)  RR: 20 (01-20-20 @ 23:30) (18 - 20)  SpO2: --      01-20-20 @ 07:01  -  01-21-20 @ 07:00  --------------------------------------------------------  IN: 600 mL / OUT: 300 mL / NET: 300 mL        Physical Exam:   GENERAL: NAD, frail, cachexic  HEENT: NCAT  CHEST/LUNG: CTAB  HEART: Regular rate and rhythm; s1 s2 appreciated, No murmurs, rubs, or gallops  ABDOMEN: Soft, Nontender, Nondistended; Bowel sounds present. colostomy tube   EXTREMITIES: +1 LE edema b/l, ecchymosis on hands R>>L, including palms   NERVOUS SYSTEM:  Alert & Oriented X1 (name)        Labs:                         11.5   10.77 )-----------( 60       ( 20 Jan 2020 07:19 )             36.4       21 Jan 2020 05:30    155    |  119    |  45     ----------------------------<  110    4.7     |  23     |  0.8      Ca    8.5        21 Jan 2020 05:30  Mg     2.1       21 Jan 2020 05:30              Serum Pro-Brain Natriuretic Peptide: 13619 pg/mL (01-13-20 @ 00:37) Hospital Day:  9d    Subjective:    Patient is a 89y old  Female who presents with a chief complaint of respiratory distress. No acute events overnight and in no distress this am. AAOx 1 (name), trying to answer question, mumbles, holds eye contact when answering questions.     Admitted for severe sepsis       Past Medical Hx:   Afib    Past Sx:  Cholecystostomy drain infection    Allergies:  penicillins (Other (U))  sulfonamides (Other (U))    Current Meds:   Standng Meds:  chlorhexidine 4% Liquid 1 Application(s) Topical <User Schedule>  dextrose 5%. 1000 milliLiter(s) (50 mL/Hr) IV Continuous <Continuous>  metoprolol succinate ER 25 milliGRAM(s) Oral daily  nystatin Powder 1 Application(s) Topical two times a day    PRN Meds:  morphine  - Injectable 0.5 milliGRAM(s) IV Push every 4 hours PRN Moderate Pain (4 - 6)    HOME MEDICATIONS:  aztreonam 500 mg injection:   Eliquis 2.5 mg oral tablet: 1 tab(s) orally 2 times a day  Flagyl 500 mg oral tablet: 1 tab(s) orally 3 times a day  Lasix 20 mg oral tablet: 1 tab(s) orally once a day  Zyvox 2 mg/mL-D5% intravenous solution:       Vital Signs:   T(F): 96.2 (01-20-20 @ 23:30), Max: 96.9 (01-20-20 @ 07:52)  HR: 105 (01-20-20 @ 23:30) (105 - 148)  BP: 103/73 (01-20-20 @ 23:30) (103/73 - 136/73)  RR: 20 (01-20-20 @ 23:30) (18 - 20)  SpO2: --      01-20-20 @ 07:01  -  01-21-20 @ 07:00  --------------------------------------------------------  IN: 600 mL / OUT: 300 mL / NET: 300 mL        Physical Exam:   GENERAL: NAD, frail, cachexic  HEENT: NCAT  CHEST/LUNG: CTAB  HEART: Regular rate and rhythm; s1 s2 appreciated, No murmurs, rubs, or gallops  ABDOMEN: Soft, Nontender, Nondistended; Bowel sounds present. colostomy tube   EXTREMITIES: +1 LE edema b/l, ecchymosis on hands R>>L, including palms   NERVOUS SYSTEM:  Alert & Oriented X1 (name)        Labs:                         11.5   10.77 )-----------( 60       ( 20 Jan 2020 07:19 )             36.4       21 Jan 2020 05:30    155    |  119    |  45     ----------------------------<  110    4.7     |  23     |  0.8      Ca    8.5        21 Jan 2020 05:30  Mg     2.1       21 Jan 2020 05:30              Serum Pro-Brain Natriuretic Peptide: 98696 pg/mL (01-13-20 @ 00:37)

## 2020-01-21 NOTE — PROGRESS NOTE ADULT - ASSESSMENT
89yFemale being reevaluated for symptom management and GOC       Morphine Equivalent Daily Dose (MEDD) 0mg    Over 16 minutes spent discussing Advance Care Planning with son/HCP Rashel. NOT ready for CMO. Do no want artificial nutrition by any route. Do want current medical plan to monitor Na and treat with IVF to continue. Open to hospice consult as they do not want to continue in/out of hospital.        Recommendations:  Continue morphine as written.   Hospice consult placed  Continue  current level of care; do not esclate  DNR/I    Will follow.       Please Call [phone] PRN

## 2020-01-22 VITALS
SYSTOLIC BLOOD PRESSURE: 90 MMHG | TEMPERATURE: 97 F | RESPIRATION RATE: 20 BRPM | DIASTOLIC BLOOD PRESSURE: 60 MMHG | HEART RATE: 75 BPM | OXYGEN SATURATION: 97 %

## 2020-01-22 PROCEDURE — 99238 HOSP IP/OBS DSCHRG MGMT 30/<: CPT

## 2020-01-22 RX ORDER — SODIUM CHLORIDE 9 MG/ML
500 INJECTION, SOLUTION INTRAVENOUS
Refills: 0 | Status: COMPLETED | OUTPATIENT
Start: 2020-01-22 | End: 2020-01-22

## 2020-01-22 RX ORDER — SODIUM CHLORIDE 9 MG/ML
500 INJECTION, SOLUTION INTRAVENOUS
Refills: 0 | Status: DISCONTINUED | OUTPATIENT
Start: 2020-01-22 | End: 2020-01-22

## 2020-01-22 RX ADMIN — SODIUM CHLORIDE 1000 MILLILITER(S): 9 INJECTION, SOLUTION INTRAVENOUS at 02:05

## 2020-01-22 RX ADMIN — MIDODRINE HYDROCHLORIDE 10 MILLIGRAM(S): 2.5 TABLET ORAL at 00:16

## 2020-01-22 NOTE — DISCHARGE NOTE FOR THE EXPIRED PATIENT - NS PATIENT DEATH CRITERIA
Patient is dead based on Cardiopulmonary criteria including absent breath sounds, pulselessness and/or asystole n/a

## 2020-01-22 NOTE — DISCHARGE NOTE FOR THE EXPIRED PATIENT - HOSPITAL COURSE
89 year old with pmhx of Afib on Eliquis, diverticulosis, GERD, chronic Smith right eye blindness, presents from NH with respiratory distress for 2 days duration.   Patient was discharged to NH from [facility] after being treated for septic shock secondary to PNA, her previous hospitalization was complicated with cholecystitis managed with cholecystostomy tube, and RITU.   Per family she is lethargic and poorly communicate since she admitted to NH ,  worsening SOB for  2 days prior to admission. SOB was  associated with cough, runny nose, denies chest pain, urinary symptoms, abd pain.   per NH nurse, patient ahd low appetite, poor PO intake, was not participating in rehab. was not complaining of any other symptoms.   was discharged to NH on Aztreonam, flagyl and zyvox. the last one was never given there due to delay in delivery.   per family; at baseline she has mild dementia and she was fully independent before recent illness last week.  in ed 127/114, hr 127, 100% o2 sat on 8 Ls, temp 101.7 lactate 3.8, CXR showed b/l lower lobe opacities.   Admitted with  Smith catheter that was renewed in ED.   CT  abd in ED that showed Marked right heart enlargement with reflux contrast into the IVC and hepatic veins, compatible with right heart failure. Cholecystostomy tube in gallbladder fossa. and increased soft tissue density in the presacral space etiology not apparent on this exam however rectal pathology is a consideration. Admitted for Sepsis and acute Respiratory failure  likely secondary to Gram Neg PNA.  During her Stay she was treated with IV antibiotics.  Cholecystitis with colostomy tube  continued to drain during  stay.  She later developed thrombocytopenia and Hypernatremia. Patient stay was further complicated  by  worsening metal status and decreased PO intake, likely melana.  Palliative care was brought on aboard  and it  was decided along with family  in light  of poor prognosis  there would be no excalation of care. Family requested Hospice.      Interval  Events  During the night  patient Became more hypotensive than usual and was given midodrine and small bolus of 1/5 NS 500ml.  Her Bp improved to 90/60 BP.  Later around 0450 Nursing staff called   stated that the patient had no respiration and no Heart Beat on monitor.  Patient  was pronounce at bedside  at 0500.

## 2020-01-27 DIAGNOSIS — K57.90 DIVERTICULOSIS OF INTESTINE, PART UNSPECIFIED, WITHOUT PERFORATION OR ABSCESS WITHOUT BLEEDING: ICD-10-CM

## 2020-01-27 DIAGNOSIS — A41.9 SEPSIS, UNSPECIFIED ORGANISM: ICD-10-CM

## 2020-01-27 DIAGNOSIS — I48.91 UNSPECIFIED ATRIAL FIBRILLATION: ICD-10-CM

## 2020-01-27 DIAGNOSIS — E46 UNSPECIFIED PROTEIN-CALORIE MALNUTRITION: ICD-10-CM

## 2020-01-27 DIAGNOSIS — J15.6 PNEUMONIA DUE TO OTHER GRAM-NEGATIVE BACTERIA: ICD-10-CM

## 2020-01-27 DIAGNOSIS — I50.23 ACUTE ON CHRONIC SYSTOLIC (CONGESTIVE) HEART FAILURE: ICD-10-CM

## 2020-01-27 DIAGNOSIS — I27.22 PULMONARY HYPERTENSION DUE TO LEFT HEART DISEASE: ICD-10-CM

## 2020-01-27 DIAGNOSIS — Z66 DO NOT RESUSCITATE: ICD-10-CM

## 2020-01-27 DIAGNOSIS — Z79.01 LONG TERM (CURRENT) USE OF ANTICOAGULANTS: ICD-10-CM

## 2020-01-27 DIAGNOSIS — I50.814 RIGHT HEART FAILURE DUE TO LEFT HEART FAILURE: ICD-10-CM

## 2020-01-27 DIAGNOSIS — Z88.0 ALLERGY STATUS TO PENICILLIN: ICD-10-CM

## 2020-01-27 DIAGNOSIS — D69.59 OTHER SECONDARY THROMBOCYTOPENIA: ICD-10-CM

## 2020-01-27 DIAGNOSIS — E87.2 ACIDOSIS: ICD-10-CM

## 2020-01-27 DIAGNOSIS — I25.10 ATHEROSCLEROTIC HEART DISEASE OF NATIVE CORONARY ARTERY WITHOUT ANGINA PECTORIS: ICD-10-CM

## 2020-01-27 DIAGNOSIS — G93.41 METABOLIC ENCEPHALOPATHY: ICD-10-CM

## 2020-01-27 DIAGNOSIS — N18.3 CHRONIC KIDNEY DISEASE, STAGE 3 (MODERATE): ICD-10-CM

## 2020-01-27 DIAGNOSIS — Z51.5 ENCOUNTER FOR PALLIATIVE CARE: ICD-10-CM

## 2020-01-27 DIAGNOSIS — N39.0 URINARY TRACT INFECTION, SITE NOT SPECIFIED: ICD-10-CM

## 2020-01-27 DIAGNOSIS — R65.20 SEVERE SEPSIS WITHOUT SEPTIC SHOCK: ICD-10-CM

## 2020-01-27 DIAGNOSIS — I21.A1 MYOCARDIAL INFARCTION TYPE 2: ICD-10-CM

## 2020-01-27 DIAGNOSIS — J96.01 ACUTE RESPIRATORY FAILURE WITH HYPOXIA: ICD-10-CM

## 2020-01-27 DIAGNOSIS — I08.1 RHEUMATIC DISORDERS OF BOTH MITRAL AND TRICUSPID VALVES: ICD-10-CM

## 2020-01-27 DIAGNOSIS — E87.0 HYPEROSMOLALITY AND HYPERNATREMIA: ICD-10-CM

## 2020-01-27 DIAGNOSIS — K21.9 GASTRO-ESOPHAGEAL REFLUX DISEASE WITHOUT ESOPHAGITIS: ICD-10-CM
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
## 2020-01-27 DIAGNOSIS — T83.511A INFECTION AND INFLAMMATORY REACTION DUE TO INDWELLING URETHRAL CATHETER, INITIAL ENCOUNTER: ICD-10-CM

## 2020-01-27 DIAGNOSIS — N17.9 ACUTE KIDNEY FAILURE, UNSPECIFIED: ICD-10-CM

## 2020-01-27 DIAGNOSIS — Z88.2 ALLERGY STATUS TO SULFONAMIDES: ICD-10-CM

## 2023-06-10 NOTE — PATIENT PROFILE ADULT - NSPROMEDSADMININFO_GEN_A_NUR
OFFICE NOTE    NAME OF THE PATIENT: Charles Chan    YOB: 1943    CHIEF COMPLAINT:  This gentleman today came here for multiple medical issues.  He is waking up three times at night for pee-pee.  If he does not take his prostate medicine it happens even worse, he is concerned.  Follow-up on kidney function and blood work.  Appetite and weight are okay.  No problem.    PAST MEDICAL HISTORY:  Reviewed on EMR, unchanged.    CURRENT MEDICATIONS:  Reviewed on EMR, unchanged.  List reviewed.    ALLERGIES:  Reviewed on EMR, unchanged.    SOCIAL HISTORY:  Reviewed on EMR, unchanged.  He does not smoke and does not drink alcohol.    FAMILY HISTORY:  Reviewed on EMR, unchanged.    REVIEW OF SYSTEMS:  All 12 systems reviewed and pertaining covered in history and physical.    PHYSICAL EXAMINATION  VITAL SIGNS:  As recorded and reviewed from EMR.  RESPIRATORY:  The patient had normal inspirations and expirations.  The breath sounds were equal bilaterally and clear to auscultation.  CARDIOVASCULAR:  The patient had S1 normal, split S2 without obvious rubs, clicks, or murmurs.    GASTROINTESTINAL:  There was no hepatosplenomegaly.  There were no palpable masses and no inguinal nodes.  EXTREMITIES:  Legs had no edema.  NEUROLOGIC:  The patient had normal cranial nerves.  The reflexes, sensory, and motor examination were grossly within normal limits.    LAB WORK:  Laboratory testing discussed.    ASSESSMENT AND PLAN:  BPH.  Might need TURP.  I am going to monitor.  We discussed in detail, different options.  Continue medicine.  Renal insufficiency, much better.  Chronic renal failure, stage 3, improved.  Hypertension, okay.  High cholesterol, stable.  Follow up in a month.  Continue to follow.      Kindly review this note in conjunction with EMR.   Subjective   Patient ID: Charles Chan is a 80 y.o. male who presents for Follow-up.      HPI    Review of Systems    Objective   /70   Ht 1.829 m (6')   Wt  107 kg (235 lb)   BMI 31.87 kg/m²       Physical Exam    Assessment/Plan   Problem List Items Addressed This Visit          Other    Pure hypercholesterolemia          patient a&ox1, no family present, no NH paperwork in chart